# Patient Record
Sex: FEMALE | Race: BLACK OR AFRICAN AMERICAN | NOT HISPANIC OR LATINO | URBAN - METROPOLITAN AREA
[De-identification: names, ages, dates, MRNs, and addresses within clinical notes are randomized per-mention and may not be internally consistent; named-entity substitution may affect disease eponyms.]

---

## 2018-01-02 ENCOUNTER — EMERGENCY (EMERGENCY)
Facility: HOSPITAL | Age: 38
LOS: 1 days | Discharge: HOME | End: 2018-01-02
Attending: EMERGENCY MEDICINE
Payer: MEDICAID

## 2018-01-02 VITALS
TEMPERATURE: 97 F | RESPIRATION RATE: 16 BRPM | WEIGHT: 270.07 LBS | HEART RATE: 71 BPM | DIASTOLIC BLOOD PRESSURE: 64 MMHG | HEIGHT: 65 IN | OXYGEN SATURATION: 100 % | SYSTOLIC BLOOD PRESSURE: 123 MMHG

## 2018-01-02 VITALS — HEART RATE: 74 BPM | SYSTOLIC BLOOD PRESSURE: 128 MMHG | DIASTOLIC BLOOD PRESSURE: 79 MMHG

## 2018-01-02 LAB — HCG UR QL: NEGATIVE — SIGNIFICANT CHANGE UP

## 2018-01-02 PROCEDURE — 99284 EMERGENCY DEPT VISIT MOD MDM: CPT

## 2018-01-02 PROCEDURE — 99283 EMERGENCY DEPT VISIT LOW MDM: CPT | Mod: 25

## 2018-01-02 PROCEDURE — 96372 THER/PROPH/DIAG INJ SC/IM: CPT

## 2018-01-02 PROCEDURE — 93005 ELECTROCARDIOGRAM TRACING: CPT

## 2018-01-02 PROCEDURE — 71046 X-RAY EXAM CHEST 2 VIEWS: CPT | Mod: 26

## 2018-01-02 PROCEDURE — 81025 URINE PREGNANCY TEST: CPT

## 2018-01-02 PROCEDURE — 71046 X-RAY EXAM CHEST 2 VIEWS: CPT

## 2018-01-02 RX ORDER — KETOROLAC TROMETHAMINE 30 MG/ML
30 SYRINGE (ML) INJECTION ONCE
Qty: 0 | Refills: 0 | Status: DISCONTINUED | OUTPATIENT
Start: 2018-01-02 | End: 2018-01-02

## 2018-01-02 RX ORDER — DIAZEPAM 5 MG
5 TABLET ORAL ONCE
Qty: 0 | Refills: 0 | Status: DISCONTINUED | OUTPATIENT
Start: 2018-01-02 | End: 2018-01-02

## 2018-01-02 RX ORDER — OXYCODONE AND ACETAMINOPHEN 5; 325 MG/1; MG/1
1 TABLET ORAL ONCE
Qty: 0 | Refills: 0 | Status: DISCONTINUED | OUTPATIENT
Start: 2018-01-02 | End: 2018-01-02

## 2018-01-02 RX ORDER — OXYCODONE HYDROCHLORIDE 5 MG/1
1 TABLET ORAL
Qty: 5 | Refills: 0
Start: 2018-01-02

## 2018-01-02 RX ADMIN — Medication 30 MILLIGRAM(S): at 09:38

## 2018-01-02 RX ADMIN — OXYCODONE AND ACETAMINOPHEN 1 TABLET(S): 5; 325 TABLET ORAL at 11:57

## 2018-01-02 RX ADMIN — Medication 30 MILLIGRAM(S): at 11:51

## 2018-01-02 RX ADMIN — Medication 5 MILLIGRAM(S): at 09:38

## 2018-01-02 NOTE — ED PROVIDER NOTE - PHYSICAL EXAMINATION
MUSCULOSKELETAL:  Left upper thoracic tenderness and spasms, bilateral lower lumber tenderness and spasms, no vertebral tenderness

## 2018-01-02 NOTE — ED PROVIDER NOTE - MEDICAL DECISION MAKING DETAILS
Patient with back pain, likely musculoskeletal due to worse pain with bending/twisting. Had chest pain 2 days ago, none now. Normal CXR and EKG. History and exam not concerning for ACS or PE. PERC zero.

## 2018-01-02 NOTE — ED PROVIDER NOTE - CRANIAL NERVE AND PUPILLARY EXAM
cranial nerves 2-12 intact/central and peripheral vision intact/extra-ocular movements intact/gag reflex intact/tongue is midline/central vision intact/cough reflex intact

## 2018-01-02 NOTE — ED PROVIDER NOTE - OBJECTIVE STATEMENT
36 y/o F pt w/ no significant PMHx presents to the ED c/o back pain x 3 days. Pt states that pain is located in the upper left back, radiating to the neck and lower back; worse with. Pt states that when she woke up this morning her back pain caused her to have trouble getting out of bed. Pt notes that two days ago the pain radiated to the chest; no CP today or tomorrow. Pt denies fever, cough, SOB, abd pain, focal weakness, paresthesias, incontinence, and any other complaints. Allergic to penicillin.

## 2018-01-02 NOTE — ED PROVIDER NOTE - CARE PLAN
Principal Discharge DX:	Back pain, unspecified back location, unspecified back pain laterality, unspecified chronicity  Secondary Diagnosis:	Chest pain, unspecified type

## 2020-10-01 ENCOUNTER — APPOINTMENT (OUTPATIENT)
Dept: OBGYN | Facility: CLINIC | Age: 40
End: 2020-10-01

## 2020-10-12 ENCOUNTER — APPOINTMENT (OUTPATIENT)
Dept: OBGYN | Facility: CLINIC | Age: 40
End: 2020-10-12

## 2020-11-04 ENCOUNTER — APPOINTMENT (OUTPATIENT)
Dept: OBGYN | Facility: CLINIC | Age: 40
End: 2020-11-04
Payer: MEDICAID

## 2020-11-04 PROCEDURE — 81025 URINE PREGNANCY TEST: CPT

## 2020-11-04 PROCEDURE — 36415 COLL VENOUS BLD VENIPUNCTURE: CPT

## 2020-11-04 PROCEDURE — 99072 ADDL SUPL MATRL&STAF TM PHE: CPT

## 2020-11-04 PROCEDURE — 99203 OFFICE O/P NEW LOW 30 MIN: CPT | Mod: TH

## 2020-11-05 ENCOUNTER — APPOINTMENT (OUTPATIENT)
Dept: OBGYN | Facility: CLINIC | Age: 40
End: 2020-11-05

## 2020-11-18 ENCOUNTER — APPOINTMENT (OUTPATIENT)
Dept: OBGYN | Facility: CLINIC | Age: 40
End: 2020-11-18
Payer: MEDICAID

## 2020-11-18 PROCEDURE — 99211 OFF/OP EST MAY X REQ PHY/QHP: CPT | Mod: TH

## 2020-11-23 ENCOUNTER — APPOINTMENT (OUTPATIENT)
Dept: ANTEPARTUM | Facility: CLINIC | Age: 40
End: 2020-11-23
Payer: MEDICAID

## 2020-11-23 PROCEDURE — 99211 OFF/OP EST MAY X REQ PHY/QHP: CPT | Mod: TH

## 2020-11-24 ENCOUNTER — APPOINTMENT (OUTPATIENT)
Dept: ANTEPARTUM | Facility: CLINIC | Age: 40
End: 2020-11-24

## 2020-11-30 ENCOUNTER — APPOINTMENT (OUTPATIENT)
Dept: ANTEPARTUM | Facility: CLINIC | Age: 40
End: 2020-11-30
Payer: MEDICAID

## 2020-11-30 ENCOUNTER — APPOINTMENT (OUTPATIENT)
Dept: OBGYN | Facility: CLINIC | Age: 40
End: 2020-11-30
Payer: MEDICAID

## 2020-11-30 PROCEDURE — 99072 ADDL SUPL MATRL&STAF TM PHE: CPT

## 2020-11-30 PROCEDURE — 76818 FETAL BIOPHYS PROFILE W/NST: CPT

## 2020-11-30 PROCEDURE — 99211 OFF/OP EST MAY X REQ PHY/QHP: CPT | Mod: TH

## 2020-11-30 PROCEDURE — 76820 UMBILICAL ARTERY ECHO: CPT

## 2020-12-07 ENCOUNTER — APPOINTMENT (OUTPATIENT)
Dept: ANTEPARTUM | Facility: CLINIC | Age: 40
End: 2020-12-07
Payer: MEDICAID

## 2020-12-07 ENCOUNTER — APPOINTMENT (OUTPATIENT)
Dept: OBGYN | Facility: CLINIC | Age: 40
End: 2020-12-07
Payer: MEDICAID

## 2020-12-07 PROCEDURE — 99072 ADDL SUPL MATRL&STAF TM PHE: CPT

## 2020-12-07 PROCEDURE — 99211 OFF/OP EST MAY X REQ PHY/QHP: CPT | Mod: TH

## 2020-12-07 PROCEDURE — 76820 UMBILICAL ARTERY ECHO: CPT

## 2020-12-07 PROCEDURE — 76818 FETAL BIOPHYS PROFILE W/NST: CPT

## 2020-12-07 PROCEDURE — 76816 OB US FOLLOW-UP PER FETUS: CPT

## 2020-12-21 ENCOUNTER — APPOINTMENT (OUTPATIENT)
Dept: OBGYN | Facility: CLINIC | Age: 40
End: 2020-12-21

## 2020-12-22 ENCOUNTER — APPOINTMENT (OUTPATIENT)
Dept: OBGYN | Facility: CLINIC | Age: 40
End: 2020-12-22
Payer: MEDICAID

## 2020-12-22 PROCEDURE — 99211 OFF/OP EST MAY X REQ PHY/QHP: CPT | Mod: TH

## 2020-12-22 PROCEDURE — 99072 ADDL SUPL MATRL&STAF TM PHE: CPT

## 2020-12-30 ENCOUNTER — APPOINTMENT (OUTPATIENT)
Dept: ANTEPARTUM | Facility: CLINIC | Age: 40
End: 2020-12-30
Payer: MEDICAID

## 2020-12-30 ENCOUNTER — APPOINTMENT (OUTPATIENT)
Dept: OBGYN | Facility: CLINIC | Age: 40
End: 2020-12-30
Payer: MEDICAID

## 2020-12-30 PROCEDURE — 76816 OB US FOLLOW-UP PER FETUS: CPT

## 2020-12-30 PROCEDURE — 99072 ADDL SUPL MATRL&STAF TM PHE: CPT

## 2020-12-30 PROCEDURE — 99211 OFF/OP EST MAY X REQ PHY/QHP: CPT | Mod: TH

## 2020-12-30 PROCEDURE — 76819 FETAL BIOPHYS PROFIL W/O NST: CPT

## 2021-01-06 ENCOUNTER — APPOINTMENT (OUTPATIENT)
Dept: OBGYN | Facility: CLINIC | Age: 41
End: 2021-01-06
Payer: MEDICAID

## 2021-01-06 PROCEDURE — 99072 ADDL SUPL MATRL&STAF TM PHE: CPT

## 2021-01-06 PROCEDURE — 99211 OFF/OP EST MAY X REQ PHY/QHP: CPT | Mod: TH

## 2021-01-13 ENCOUNTER — APPOINTMENT (OUTPATIENT)
Dept: OBGYN | Facility: CLINIC | Age: 41
End: 2021-01-13
Payer: MEDICAID

## 2021-01-13 PROCEDURE — 99211 OFF/OP EST MAY X REQ PHY/QHP: CPT | Mod: TH

## 2021-01-13 PROCEDURE — 99072 ADDL SUPL MATRL&STAF TM PHE: CPT

## 2021-01-19 ENCOUNTER — APPOINTMENT (OUTPATIENT)
Dept: ANTEPARTUM | Facility: CLINIC | Age: 41
End: 2021-01-19
Payer: MEDICAID

## 2021-01-19 ENCOUNTER — APPOINTMENT (OUTPATIENT)
Dept: OBGYN | Facility: CLINIC | Age: 41
End: 2021-01-19
Payer: MEDICAID

## 2021-01-19 PROCEDURE — 99072 ADDL SUPL MATRL&STAF TM PHE: CPT

## 2021-01-19 PROCEDURE — 99211 OFF/OP EST MAY X REQ PHY/QHP: CPT | Mod: TH

## 2021-01-19 PROCEDURE — 76818 FETAL BIOPHYS PROFILE W/NST: CPT

## 2021-01-19 PROCEDURE — 76816 OB US FOLLOW-UP PER FETUS: CPT

## 2021-01-20 ENCOUNTER — TRANSCRIPTION ENCOUNTER (OUTPATIENT)
Age: 41
End: 2021-01-20

## 2021-01-20 ENCOUNTER — OUTPATIENT (OUTPATIENT)
Dept: INPATIENT UNIT | Facility: HOSPITAL | Age: 41
LOS: 1 days | End: 2021-01-20

## 2021-01-20 ENCOUNTER — INPATIENT (INPATIENT)
Facility: HOSPITAL | Age: 41
LOS: 3 days | Discharge: HOME | End: 2021-01-24
Attending: STUDENT IN AN ORGANIZED HEALTH CARE EDUCATION/TRAINING PROGRAM | Admitting: STUDENT IN AN ORGANIZED HEALTH CARE EDUCATION/TRAINING PROGRAM
Payer: MEDICAID

## 2021-01-20 VITALS
SYSTOLIC BLOOD PRESSURE: 142 MMHG | TEMPERATURE: 98 F | DIASTOLIC BLOOD PRESSURE: 77 MMHG | HEART RATE: 102 BPM | RESPIRATION RATE: 15 BRPM

## 2021-01-20 DIAGNOSIS — O13.3 GESTATIONAL [PREGNANCY-INDUCED] HYPERTENSION WITHOUT SIGNIFICANT PROTEINURIA, THIRD TRIMESTER: ICD-10-CM

## 2021-01-20 DIAGNOSIS — Z98.84 BARIATRIC SURGERY STATUS: Chronic | ICD-10-CM

## 2021-01-20 DIAGNOSIS — Z98.890 OTHER SPECIFIED POSTPROCEDURAL STATES: Chronic | ICD-10-CM

## 2021-01-20 DIAGNOSIS — Z86.19 PERSONAL HISTORY OF OTHER INFECTIOUS AND PARASITIC DISEASES: Chronic | ICD-10-CM

## 2021-01-20 DIAGNOSIS — Z3A.00 WEEKS OF GESTATION OF PREGNANCY NOT SPECIFIED: ICD-10-CM

## 2021-01-20 DIAGNOSIS — O26.899 OTHER SPECIFIED PREGNANCY RELATED CONDITIONS, UNSPECIFIED TRIMESTER: ICD-10-CM

## 2021-01-20 DIAGNOSIS — O04.80 (INDUCED) TERMINATION OF PREGNANCY WITH UNSPECIFIED COMPLICATIONS: Chronic | ICD-10-CM

## 2021-01-20 DIAGNOSIS — O16.9 UNSPECIFIED MATERNAL HYPERTENSION, UNSPECIFIED TRIMESTER: ICD-10-CM

## 2021-01-20 LAB
ALBUMIN SERPL ELPH-MCNC: 3.6 G/DL — SIGNIFICANT CHANGE UP (ref 3.3–5)
ALP SERPL-CCNC: 133 U/L — HIGH (ref 40–120)
ALT FLD-CCNC: 14 U/L — SIGNIFICANT CHANGE UP (ref 4–33)
ANION GAP SERPL CALC-SCNC: 12 MMOL/L — SIGNIFICANT CHANGE UP (ref 7–14)
APPEARANCE UR: ABNORMAL
APTT BLD: 26.2 SEC — LOW (ref 27–36.3)
AST SERPL-CCNC: 17 U/L — SIGNIFICANT CHANGE UP (ref 4–32)
BACTERIA # UR AUTO: NEGATIVE — SIGNIFICANT CHANGE UP
BASOPHILS # BLD AUTO: 0.01 K/UL — SIGNIFICANT CHANGE UP (ref 0–0.2)
BASOPHILS # BLD AUTO: 0.02 K/UL — SIGNIFICANT CHANGE UP (ref 0–0.2)
BASOPHILS NFR BLD AUTO: 0.1 % — SIGNIFICANT CHANGE UP (ref 0–2)
BASOPHILS NFR BLD AUTO: 0.3 % — SIGNIFICANT CHANGE UP (ref 0–2)
BILIRUB SERPL-MCNC: <0.2 MG/DL — SIGNIFICANT CHANGE UP (ref 0.2–1.2)
BILIRUB UR-MCNC: NEGATIVE — SIGNIFICANT CHANGE UP
BLD GP AB SCN SERPL QL: NEGATIVE — SIGNIFICANT CHANGE UP
BUN SERPL-MCNC: 9 MG/DL — SIGNIFICANT CHANGE UP (ref 7–23)
CALCIUM SERPL-MCNC: 9.4 MG/DL — SIGNIFICANT CHANGE UP (ref 8.4–10.5)
CHLORIDE SERPL-SCNC: 102 MMOL/L — SIGNIFICANT CHANGE UP (ref 98–107)
CO2 SERPL-SCNC: 20 MMOL/L — LOW (ref 22–31)
COLOR SPEC: YELLOW — SIGNIFICANT CHANGE UP
CREAT ?TM UR-MCNC: 143 MG/DL — SIGNIFICANT CHANGE UP
CREAT SERPL-MCNC: 0.67 MG/DL — SIGNIFICANT CHANGE UP (ref 0.5–1.3)
DIFF PNL FLD: ABNORMAL
EOSINOPHIL # BLD AUTO: 0.01 K/UL — SIGNIFICANT CHANGE UP (ref 0–0.5)
EOSINOPHIL # BLD AUTO: 0.04 K/UL — SIGNIFICANT CHANGE UP (ref 0–0.5)
EOSINOPHIL NFR BLD AUTO: 0.1 % — SIGNIFICANT CHANGE UP (ref 0–6)
EOSINOPHIL NFR BLD AUTO: 0.6 % — SIGNIFICANT CHANGE UP (ref 0–6)
EPI CELLS # UR: 2 /HPF — SIGNIFICANT CHANGE UP (ref 0–5)
FIBRINOGEN PPP-MCNC: 592 MG/DL — HIGH (ref 290–520)
GLUCOSE SERPL-MCNC: 87 MG/DL — SIGNIFICANT CHANGE UP (ref 70–99)
GLUCOSE UR QL: NEGATIVE — SIGNIFICANT CHANGE UP
HCT VFR BLD CALC: 35.4 % — SIGNIFICANT CHANGE UP (ref 34.5–45)
HCT VFR BLD CALC: 36.9 % — SIGNIFICANT CHANGE UP (ref 34.5–45)
HGB BLD-MCNC: 11.1 G/DL — LOW (ref 11.5–15.5)
HGB BLD-MCNC: 11.4 G/DL — LOW (ref 11.5–15.5)
HYALINE CASTS # UR AUTO: 2 /LPF — SIGNIFICANT CHANGE UP (ref 0–7)
IANC: 4.68 K/UL — SIGNIFICANT CHANGE UP (ref 1.5–8.5)
IANC: 5.9 K/UL — SIGNIFICANT CHANGE UP (ref 1.5–8.5)
IMM GRANULOCYTES NFR BLD AUTO: 0.4 % — SIGNIFICANT CHANGE UP (ref 0–1.5)
IMM GRANULOCYTES NFR BLD AUTO: 0.8 % — SIGNIFICANT CHANGE UP (ref 0–1.5)
INR BLD: 0.91 RATIO — SIGNIFICANT CHANGE UP (ref 0.88–1.16)
KETONES UR-MCNC: ABNORMAL
LDH SERPL L TO P-CCNC: 162 U/L — SIGNIFICANT CHANGE UP (ref 135–225)
LEUKOCYTE ESTERASE UR-ACNC: NEGATIVE — SIGNIFICANT CHANGE UP
LYMPHOCYTES # BLD AUTO: 1.14 K/UL — SIGNIFICANT CHANGE UP (ref 1–3.3)
LYMPHOCYTES # BLD AUTO: 1.63 K/UL — SIGNIFICANT CHANGE UP (ref 1–3.3)
LYMPHOCYTES # BLD AUTO: 15.1 % — SIGNIFICANT CHANGE UP (ref 13–44)
LYMPHOCYTES # BLD AUTO: 22.8 % — SIGNIFICANT CHANGE UP (ref 13–44)
MCHC RBC-ENTMCNC: 27.2 PG — SIGNIFICANT CHANGE UP (ref 27–34)
MCHC RBC-ENTMCNC: 27.3 PG — SIGNIFICANT CHANGE UP (ref 27–34)
MCHC RBC-ENTMCNC: 30.9 GM/DL — LOW (ref 32–36)
MCHC RBC-ENTMCNC: 31.4 GM/DL — LOW (ref 32–36)
MCV RBC AUTO: 87 FL — SIGNIFICANT CHANGE UP (ref 80–100)
MCV RBC AUTO: 88.1 FL — SIGNIFICANT CHANGE UP (ref 80–100)
MONOCYTES # BLD AUTO: 0.46 K/UL — SIGNIFICANT CHANGE UP (ref 0–0.9)
MONOCYTES # BLD AUTO: 0.72 K/UL — SIGNIFICANT CHANGE UP (ref 0–0.9)
MONOCYTES NFR BLD AUTO: 10.1 % — SIGNIFICANT CHANGE UP (ref 2–14)
MONOCYTES NFR BLD AUTO: 6.1 % — SIGNIFICANT CHANGE UP (ref 2–14)
NEUTROPHILS # BLD AUTO: 4.68 K/UL — SIGNIFICANT CHANGE UP (ref 1.8–7.4)
NEUTROPHILS # BLD AUTO: 5.9 K/UL — SIGNIFICANT CHANGE UP (ref 1.8–7.4)
NEUTROPHILS NFR BLD AUTO: 65.6 % — SIGNIFICANT CHANGE UP (ref 43–77)
NEUTROPHILS NFR BLD AUTO: 78 % — HIGH (ref 43–77)
NITRITE UR-MCNC: NEGATIVE — SIGNIFICANT CHANGE UP
NRBC # BLD: 0 /100 WBCS — SIGNIFICANT CHANGE UP
NRBC # BLD: 0 /100 WBCS — SIGNIFICANT CHANGE UP
NRBC # FLD: 0 K/UL — SIGNIFICANT CHANGE UP
NRBC # FLD: 0 K/UL — SIGNIFICANT CHANGE UP
PH UR: 6.5 — SIGNIFICANT CHANGE UP (ref 5–8)
PLATELET # BLD AUTO: 232 K/UL — SIGNIFICANT CHANGE UP (ref 150–400)
PLATELET # BLD AUTO: 250 K/UL — SIGNIFICANT CHANGE UP (ref 150–400)
POTASSIUM SERPL-MCNC: 4 MMOL/L — SIGNIFICANT CHANGE UP (ref 3.5–5.3)
POTASSIUM SERPL-SCNC: 4 MMOL/L — SIGNIFICANT CHANGE UP (ref 3.5–5.3)
PROT ?TM UR-MCNC: 36 MG/DL — SIGNIFICANT CHANGE UP
PROT ?TM UR-MCNC: 36 MG/DL — SIGNIFICANT CHANGE UP
PROT SERPL-MCNC: 6.7 G/DL — SIGNIFICANT CHANGE UP (ref 6–8.3)
PROT UR-MCNC: ABNORMAL
PROT/CREAT UR-RTO: 0.3 RATIO — HIGH (ref 0–0.2)
PROTHROM AB SERPL-ACNC: 10.5 SEC — LOW (ref 10.6–13.6)
RBC # BLD: 4.07 M/UL — SIGNIFICANT CHANGE UP (ref 3.8–5.2)
RBC # BLD: 4.19 M/UL — SIGNIFICANT CHANGE UP (ref 3.8–5.2)
RBC # FLD: 16.7 % — HIGH (ref 10.3–14.5)
RBC # FLD: 16.8 % — HIGH (ref 10.3–14.5)
RBC CASTS # UR COMP ASSIST: 3 /HPF — SIGNIFICANT CHANGE UP (ref 0–4)
RH IG SCN BLD-IMP: POSITIVE — SIGNIFICANT CHANGE UP
RH IG SCN BLD-IMP: POSITIVE — SIGNIFICANT CHANGE UP
SARS-COV-2 RNA SPEC QL NAA+PROBE: SIGNIFICANT CHANGE UP
SODIUM SERPL-SCNC: 134 MMOL/L — LOW (ref 135–145)
SP GR SPEC: 1.02 — SIGNIFICANT CHANGE UP (ref 1.01–1.02)
URATE SERPL-MCNC: 3 MG/DL — SIGNIFICANT CHANGE UP (ref 2.5–7)
UROBILINOGEN FLD QL: SIGNIFICANT CHANGE UP
WBC # BLD: 7.14 K/UL — SIGNIFICANT CHANGE UP (ref 3.8–10.5)
WBC # BLD: 7.56 K/UL — SIGNIFICANT CHANGE UP (ref 3.8–10.5)
WBC # FLD AUTO: 7.14 K/UL — SIGNIFICANT CHANGE UP (ref 3.8–10.5)
WBC # FLD AUTO: 7.56 K/UL — SIGNIFICANT CHANGE UP (ref 3.8–10.5)
WBC UR QL: 2 /HPF — SIGNIFICANT CHANGE UP (ref 0–5)

## 2021-01-20 RX ORDER — CITRIC ACID/SODIUM CITRATE 300-500 MG
15 SOLUTION, ORAL ORAL EVERY 6 HOURS
Refills: 0 | Status: DISCONTINUED | OUTPATIENT
Start: 2021-01-20 | End: 2021-01-21

## 2021-01-20 RX ORDER — VANCOMYCIN HCL 1 G
2000 VIAL (EA) INTRAVENOUS ONCE
Refills: 0 | Status: COMPLETED | OUTPATIENT
Start: 2021-01-20 | End: 2021-01-20

## 2021-01-20 RX ORDER — SODIUM CHLORIDE 9 MG/ML
1000 INJECTION, SOLUTION INTRAVENOUS
Refills: 0 | Status: DISCONTINUED | OUTPATIENT
Start: 2021-01-20 | End: 2021-01-21

## 2021-01-20 RX ORDER — AMPICILLIN TRIHYDRATE 250 MG
1 CAPSULE ORAL EVERY 4 HOURS
Refills: 0 | Status: DISCONTINUED | OUTPATIENT
Start: 2021-01-20 | End: 2021-01-20

## 2021-01-20 RX ORDER — VANCOMYCIN HCL 1 G
2000 VIAL (EA) INTRAVENOUS EVERY 8 HOURS
Refills: 0 | Status: DISCONTINUED | OUTPATIENT
Start: 2021-01-21 | End: 2021-01-21

## 2021-01-20 RX ORDER — INFLUENZA VIRUS VACCINE 15; 15; 15; 15 UG/.5ML; UG/.5ML; UG/.5ML; UG/.5ML
0.5 SUSPENSION INTRAMUSCULAR ONCE
Refills: 0 | Status: COMPLETED | OUTPATIENT
Start: 2021-01-20 | End: 2021-01-20

## 2021-01-20 RX ORDER — OXYTOCIN 10 UNIT/ML
333.33 VIAL (ML) INJECTION
Qty: 20 | Refills: 0 | Status: DISCONTINUED | OUTPATIENT
Start: 2021-01-20 | End: 2021-01-21

## 2021-01-20 RX ORDER — VANCOMYCIN HCL 1 G
VIAL (EA) INTRAVENOUS
Refills: 0 | Status: DISCONTINUED | OUTPATIENT
Start: 2021-01-20 | End: 2021-01-20

## 2021-01-20 RX ORDER — MORPHINE SULFATE 50 MG/1
4 CAPSULE, EXTENDED RELEASE ORAL ONCE
Refills: 0 | Status: DISCONTINUED | OUTPATIENT
Start: 2021-01-20 | End: 2021-01-20

## 2021-01-20 RX ORDER — SODIUM CHLORIDE 9 MG/ML
1000 INJECTION, SOLUTION INTRAVENOUS
Refills: 0 | Status: DISCONTINUED | OUTPATIENT
Start: 2021-01-20 | End: 2021-01-20

## 2021-01-20 RX ORDER — OXYTOCIN 10 UNIT/ML
333.33 VIAL (ML) INJECTION
Qty: 20 | Refills: 0 | Status: DISCONTINUED | OUTPATIENT
Start: 2021-01-20 | End: 2021-01-20

## 2021-01-20 RX ORDER — AMPICILLIN TRIHYDRATE 250 MG
2 CAPSULE ORAL ONCE
Refills: 0 | Status: DISCONTINUED | OUTPATIENT
Start: 2021-01-20 | End: 2021-01-20

## 2021-01-20 RX ORDER — VANCOMYCIN HCL 1 G
1500 VIAL (EA) INTRAVENOUS ONCE
Refills: 0 | Status: DISCONTINUED | OUTPATIENT
Start: 2021-01-20 | End: 2021-01-20

## 2021-01-20 RX ORDER — VANCOMYCIN HCL 1 G
VIAL (EA) INTRAVENOUS
Refills: 0 | Status: DISCONTINUED | OUTPATIENT
Start: 2021-01-20 | End: 2021-01-21

## 2021-01-20 RX ORDER — SODIUM CHLORIDE 9 MG/ML
1000 INJECTION, SOLUTION INTRAVENOUS ONCE
Refills: 0 | Status: COMPLETED | OUTPATIENT
Start: 2021-01-20 | End: 2021-01-20

## 2021-01-20 RX ORDER — VANCOMYCIN HCL 1 G
1500 VIAL (EA) INTRAVENOUS EVERY 8 HOURS
Refills: 0 | Status: CANCELLED | OUTPATIENT
Start: 2021-01-21 | End: 2021-01-20

## 2021-01-20 RX ADMIN — MORPHINE SULFATE 4 MILLIGRAM(S): 50 CAPSULE, EXTENDED RELEASE ORAL at 22:03

## 2021-01-20 RX ADMIN — Medication 250 MILLIGRAM(S): at 23:08

## 2021-01-20 RX ADMIN — MORPHINE SULFATE 4 MILLIGRAM(S): 50 CAPSULE, EXTENDED RELEASE ORAL at 22:06

## 2021-01-20 NOTE — OB PROVIDER TRIAGE NOTE - NSHPLABSRESULTS_GEN_ALL_CORE
11.1   7.14  )-----------( 250      ( 2021 18:15 )             35.4         134<L>  |  102  |  9   ----------------------------<  87  4.0   |  20<L>  |  0.67    Ca    9.4      2021 18:15    TPro  6.7  /  Alb  3.6  /  TBili  <0.2  /  DBili  x   /  AST  17  /  ALT  14  /  AlkPhos  133<H>        PT/INR - ( 2021 18:15 )   PT: 10.5 sec;   INR: 0.91 ratio         PTT - ( 2021 18:15 )  PTT:26.2 sec    Fibrinogen Assay (21 @ 18:15)   Fibrinogen Assay: 592 mg/dL     Urinalysis Basic - ( 2021 18:15 )    Color: Yellow / Appearance: Slightly Turbid / S.024 / pH: x  Gluc: x / Ketone: Trace  / Bili: Negative / Urobili: <2 mg/dL   Blood: x / Protein: 30 mg/dL / Nitrite: Negative   Leuk Esterase: Negative / RBC: 3 /HPF / WBC 2 /HPF   Sq Epi: x / Non Sq Epi: 2 /HPF / Bacteria: Negative    Total Protein, Random Urine (21 @ 18:15)   Total Protein, Random Urine: 36: Reference range not established for this test mg/dL     `Protein/Creatinine Ratio, Urine (21 @ 18:15)   Total Protein, Random Urine: 36: Reference range not established for this test mg/dL   Creatinine, Random Urine: 143: Reference Range:   Normal= 15-30 mg/kg Body Weight/Day mg/dL

## 2021-01-20 NOTE — OB PROVIDER TRIAGE NOTE - NS_OBGYNHISTORY_OBGYN_ALL_OB_FT
Incomplete SAB X1  Surgical ETOP x1  HSV II, Last outbreak 3 weeks ago.   h/o fibroids, s/p Lap. Myomectomy (pt states cleared by Dr Caba for vaginal delivery)  Pt denies any h/o: Abn. PAP, ovarian cysts, breast problems   Incomplete SAB X1  Surgical ETOP x1  HSV II, Last outbreak 3 weeks ago.   h/o fibroids, s/p hysteroscopic Myomectomy  Pt denies any h/o: Abn. PAP, ovarian cysts, breast problems

## 2021-01-20 NOTE — OB PROVIDER H&P - ASSESSMENT
41 y/o Black female, para 0020 @ 40+5 wks gestation, single IUP.  IOL for gHTN  Cephalic via bedside sonogram  GBS Positive  HSV II+, negative SSE  Plan of care d/w Dr Carrasco    Plan:  -Admit to L&D  -For PO Cytotec and cervical balloon  -Continuous EFM  -Routine Labs/ Covid PCR/ IgG  -Vancomycin for GBS prophylaxis in the setting of PCN allergy  -VS per routine  -IVF  -Clear fluid diet  -Reassess pain management options PRN  -Anesthesia consult

## 2021-01-20 NOTE — OB PROVIDER H&P - NS_OBGYNHISTORY_OBGYN_ALL_OB_FT
Incomplete SAB X1  Surgical ETOP x1  HSV II, Last outbreak 3 weeks ago.   h/o fibroids, s/p hysteroscopic Myomectomy  Pt denies any h/o: Abn. PAP, ovarian cysts, breast problems

## 2021-01-20 NOTE — OB PROVIDER H&P - ATTENDING COMMENTS
Agree with above note. Patient is P0 at 40+wga with preE w/o SF. Plan for IOL with PO cytotec and cervical balloon. Ampicillin for GBS+.     RAMANDEEP Carrasco MD

## 2021-01-20 NOTE — OB PROVIDER H&P - HISTORY OF PRESENT ILLNESS
Patient of Dr Caba  41 y/o Black female, para 0020 @ 40+5 wks gestation, single IUP.  Presents to triage with c/o ctx q 6 min since 1am with brownish discharge. Pt s/p VE yesterday in Dr Caba's office: "closed"  Pt endorses strong fetal movement, denies any leakage of fluid.    A/P Complications: GBS Positive; HSV II outbreak 3 weeks ago. Anemia, s/p Iron infusion x 5 doses.   Blood Transfusion: Patient will accept blood    Covid Assessment: Pt denies any: fever, chills, cough, SOB or any recent known exposure to Covid-19.    Allergies: Penicillin (Anaphylaxis)  Meds: PNV; Valtrex; Albuterol PRN  PMH: Anemia, Asthma (Last exacerbation "many years ago:, hospitalization in 3rd grade, denies intubation); Migraines  PSH: Hysteroscopic Myomectomy (); D&C X2; Gastric sleeve ()   OBgynHx    Incomplete SAB X1  Surgical ETOP x1  HSV II, Last outbreak 3 weeks ago.   h/o fibroids, s/p hysteroscopic Myomectomy  Pt denies any h/o: Abn. PAP, ovarian cysts, breast problems  PSY: Denies  EtOH/ Smoke/ Recreational substance use: denies  FH: Denies  H/W/BMI: 65"/227#/37.8         Patient of Dr Carrasco  41 y/o Black female, para 0020 @ 40+5 wks gestation, single IUP.  Presents to triage with c/o ctx q 6 min since 1am with brownish discharge. Pt s/p VE yesterday in Dr Caba's office: "closed"  Pt endorses strong fetal movement, denies any leakage of fluid.    A/P Complications: GBS Positive; HSV II outbreak 3 weeks ago. Anemia, s/p Iron infusion x 5 doses.   Blood Transfusion: Patient will accept blood    Covid Assessment: Pt denies any: fever, chills, cough, SOB or any recent known exposure to Covid-19.    Allergies: Penicillin (Anaphylaxis)  Meds: PNV; Valtrex; Albuterol PRN  PMH: Anemia, Asthma (Last exacerbation "many years ago:, hospitalization in 3rd grade, denies intubation); Migraines  PSH: Hysteroscopic Myomectomy (); D&C X2; Gastric sleeve ()   OBgynHx    Incomplete SAB X1  Surgical ETOP x1  HSV II, Last outbreak 3 weeks ago.   h/o fibroids, s/p hysteroscopic Myomectomy  Pt denies any h/o: Abn. PAP, ovarian cysts, breast problems  PSY: Denies  EtOH/ Smoke/ Recreational substance use: denies  FH: Denies  H/W/BMI: 65"/227#/37.8

## 2021-01-20 NOTE — OB PROVIDER H&P - NSHOSPITALIZATION_OBGYN_ALL_OB
Approved.
Patient called office she is asking for a Covid Antibody blood order. Patient has no symptoms nor has been tested before she states she thinks she may of had it in January.
No

## 2021-01-20 NOTE — OB PROVIDER TRIAGE NOTE - HISTORY OF PRESENT ILLNESS
Patient of Dr Caba  41 y/o Black female, para 0020 @ 40+5 wks gestation, single IUP.  Presents to triage with c/o ctx q 6 min since 1am with brownish discharge. Pt s/p VE yesterday in Dr Caba's office: "closed"  Pt endorses strong fetal movement, denies any leakage of fluid.    A/P Complications: GBS Positive; HSV II outbreak 3 weeks ago. Anemia, s/p Iron infusion x 5 doses.   Blood Transfusion: Patient will accept blood    Covid Assessment: Pt denies any: fever, chills, cough, SOB or any recent known exposure to Covid-19.    Allergies: Penicillin (Anaphylaxis)  Meds: PNV; Valtrex; Albuterol PRN  PMH: Anemia, Asthma (Last exacerbation "many years ago:, hospitalization in 3rd grade, denies intubation); Migraines  PSH: Myomectomy (); D&C X2; Gastric sleeve ()   OBgynHx    Incomplete SAB X1  Surgical ETOP x1  HSV II, Last outbreak 3 weeks ago.   h/o fibroids, s/p Lap. Myomectomy (pt states cleared by Dr Caba for vaginal delivery)  Pt denies any h/o: Abn. PAP, ovarian cysts, breast problems  PSY: Denies  EtOH/ Smoke/ Recreational substance use: denies  FH: Denies  H/W/BMI: 65"/227#/37.8    Prenatal Chart Review:  GBS:   Blood Type:  HIV:  HBsAg:  RPR:  Rubella:      Patient of Dr Caba  39 y/o Black female, para 0020 @ 40+5 wks gestation, single IUP.  Presents to triage with c/o ctx q 6 min since 1am with brownish discharge. Pt s/p VE yesterday in Dr Caba's office: "closed"  Pt endorses strong fetal movement, denies any leakage of fluid.    A/P Complications: GBS Positive; HSV II outbreak 3 weeks ago. Anemia, s/p Iron infusion x 5 doses.   Blood Transfusion: Patient will accept blood    Covid Assessment: Pt denies any: fever, chills, cough, SOB or any recent known exposure to Covid-19.    Allergies: Penicillin (Anaphylaxis)  Meds: PNV; Valtrex; Albuterol PRN  PMH: Anemia, Asthma (Last exacerbation "many years ago:, hospitalization in 3rd grade, denies intubation); Migraines  PSH: Myomectomy (); D&C X2; Gastric sleeve ()   OBgynHx    Incomplete SAB X1  Surgical ETOP x1  HSV II, Last outbreak 3 weeks ago.   h/o fibroids, s/p Lap. Myomectomy (pt states cleared by Dr Caba for vaginal delivery)  Pt denies any h/o: Abn. PAP, ovarian cysts, breast problems  PSY: Denies  EtOH/ Smoke/ Recreational substance use: denies  FH: Denies  H/W/BMI: 65"/227#/37.8         Patient of Dr Caba  39 y/o Black female, para 0020 @ 40+5 wks gestation, single IUP.  Presents to triage with c/o ctx q 6 min since 1am with brownish discharge. Pt s/p VE yesterday in Dr Caba's office: "closed"  Pt endorses strong fetal movement, denies any leakage of fluid.    A/P Complications: GBS Positive; HSV II outbreak 3 weeks ago. Anemia, s/p Iron infusion x 5 doses.   Blood Transfusion: Patient will accept blood    Covid Assessment: Pt denies any: fever, chills, cough, SOB or any recent known exposure to Covid-19.    Allergies: Penicillin (Anaphylaxis)  Meds: PNV; Valtrex; Albuterol PRN  PMH: Anemia, Asthma (Last exacerbation "many years ago:, hospitalization in 3rd grade, denies intubation); Migraines  PSH: Hysteroscopic Myomectomy (); D&C X2; Gastric sleeve ()   OBgynHx    Incomplete SAB X1  Surgical ETOP x1  HSV II, Last outbreak 3 weeks ago.   h/o fibroids, s/p hysteroscopic Myomectomy  Pt denies any h/o: Abn. PAP, ovarian cysts, breast problems  PSY: Denies  EtOH/ Smoke/ Recreational substance use: denies  FH: Denies  H/W/BMI: 65"/227#/37.8

## 2021-01-20 NOTE — OB PROVIDER H&P - NSHPPHYSICALEXAM_GEN_ALL_CORE
Gen: NAD; A+O x 3  Cardiac: S1/S2, R/R/R  Pulm: CTAB, unlabored breathing  Abdomen: Gravid, soft, non-tender  VS-BP: 142/77, rpt 126/75 (Pt asymptomatic, denies h/o elevated BP, headache, vision change/scotoma, dizziness, SOB, N+V, RUQ/epigastric pain), P 80, RR 18, T 36.7, Pain 8/10  BP trends: 142/77, 126/75, 137/77, 124/66, 125/68, 146/86  Cat 1 tracin bpm, moderate variability, +accels, no decels  [place]: Irregular q 6-9 min  SSE: Negative lesions  SVE: 0/50/-3, intact membranes  GBS Positive  Clinical EFW: 3200g  Limited TAS: SLIUP, Cephalic, posterior placenta, BPP 8/8, LEE 8.74cm  Plan:  HELLP labs

## 2021-01-20 NOTE — OB PROVIDER TRIAGE NOTE - PMH
Anemia    Asthma  albuterol/proventil  Incomplete spontaneous     Legal termination of pregnancy    Migraines

## 2021-01-20 NOTE — OB PROVIDER TRIAGE NOTE - NSHPPHYSICALEXAM_GEN_ALL_CORE
Gen: NAD; A+O x 3  Cardiac: S1/S2, R/R/R  Pulm: CTAB, unlabored breathing  Abdomen: Gravid, soft, non-tender  VS-BP: 142/77, rpt 126/75 (Pt asymptomatic, denies h/o elevated BP, headache, vision change/scotoma, dizziness, SOB, N+V, RUQ/epigastric pain), P 80, RR 18, T 36.7, Pain 8/10  NST in progress:   [place]: Irregular q 6-9 min  SVE: 0/50/-3, intact membranes  GBS Positive  Clinical EFW: 3200g  Limited TAS: SLIUP, Cephalic, posterior placenta, BPP 8/8, LEE 8.74cm Gen: NAD; A+O x 3  Cardiac: S1/S2, R/R/R  Pulm: CTAB, unlabored breathing  Abdomen: Gravid, soft, non-tender  VS-BP: 142/77, rpt 126/75 (Pt asymptomatic, denies h/o elevated BP, headache, vision change/scotoma, dizziness, SOB, N+V, RUQ/epigastric pain), P 80, RR 18, T 36.7, Pain 8/10  BP trends: 142/77, 126/75, 137/77, 124/66, 125/68, 146/86  Cat 1 tracin bpm, moderate variability, +accels, no decels  [place]: Irregular q 6-9 min  SSE: Negative lesions  SVE: 0/50/-3, intact membranes  GBS Positive  Clinical EFW: 3200g  Limited TAS: SLIUP, Cephalic, posterior placenta, BPP 8/8, LEE 8.74cm  Plan:  HELLP labs

## 2021-01-20 NOTE — OB PROVIDER TRIAGE NOTE - NSPREVIOUSSPONTANEOUSTERMINATIONSMORE20_OBGYN_ALL_OB_NU
[address] [facility] 
[phone] Patient: Cassandra Carlin MRN: [id number] :1989 Visit Information Date & Time Provider Department Dept. Phone Encounter #  
 2018  1:15 PM Dyllan Braxton PA-C [facility] [phone] [id number] Follow-up Instructions Return if symptoms worsen or fail to improve. Upcoming Health Maintenance Date Due  
 PAP AKA CERVICAL CYTOLOGY 2012 DTaP/Tdap/Td series (2 - Td) 2025 Allergies as of 2018  Review Complete On: 2018 By: Marco Garcia LPN No Known Allergies Current Immunizations  Reviewed on 2017 Name Date HPV (9-valent) 2017, 3/17/2017 HPV (Quad) 2016 Tdap 2015 Not reviewed this visit You Were Diagnosed With   
  
 Codes Comments Acute vaginitis    -  Primary ICD-10-CM: N76.0 ICD-9-CM: 616.10 Screen for STD (sexually transmitted disease)     ICD-10-CM: Z11.3 ICD-9-CM: V74.5 Vitals BP Pulse Temp Resp Height(growth percentile) Weight(growth percentile) 113/74 (BP 1 Location: Left arm, BP Patient Position: Sitting) 75 96.7 °F (35.9 °C) (Oral) 18 5' 7\" (1.702 m) 187 lb (84.8 kg) LMP SpO2 BMI OB Status Smoking Status 2018 97% 29.29 kg/m2 Having regular periods Never Smoker Vitals History BMI and BSA Data Body Mass Index Body Surface Area  
 29.29 kg/m 2 2 m 2 Preferred Pharmacy Pharmacy Name Phone [id number], [place] [address] [place] [phone] Your Updated Medication List  
  
   
This list is accurate as of 18  1:44 PM.  Always use your most recent med list.  
  
  
  
  
 dextroamphetamine-amphetamine 15 mg tablet Commonly known as:  ADDERALL  
take 1 tablet by mouth twice a day  
  
 fluconazole 150 mg tablet Commonly known as:  DIFLUCAN  
 Take 1 Tab by mouth every seven (7) days for 2 doses. Take one by mouth weekly. Elyssa Cox 1.5/30 (21) 1.5-30 mg-mcg Tab Generic drug:  norethindrone ac-eth estradiol Take  by mouth. LORazepam 0.5 mg tablet Commonly known as:  ATIVAN Take  by mouth.  
  
 metroNIDAZOLE 0.75 % vaginal gel Commonly known as:  Rendell Saint Insert 1 Applicator into vagina daily for 5 days. PROzac 10 mg capsule Generic drug:  FLUoxetine Take 10 mg by mouth three (3) times daily. Prescriptions Sent to Pharmacy Refills  
 fluconazole (DIFLUCAN) 150 mg tablet 0 Sig: Take 1 Tab by mouth every seven (7) days for 2 doses. Take one by mouth weekly. Class: Normal  
 Pharmacy: [address] Ph #: [phone] Route: Oral  
 metroNIDAZOLE (METROGEL) 0.75 % vaginal gel 0 Sig: Insert 1 Applicator into vagina daily for 5 days. Class: Normal  
 Pharmacy: [address] Ph #: [phone] Route: Vaginal  
  
We Performed the Following HEPATITIS SCREENING PANEL [[id number] Custom] HIV 1/2 AG/AB, 4TH GENERATION,W RFLX CONFIRM [id number] CPT(R)] [address] [id number] Custom] T PALLIDUM SCREEN W/REFLEX [[id number] Custom] Follow-up Instructions Return if symptoms worsen or fail to improve. Introducing [facility]! Mireya Donaldson introduces [employer] patient portal. Now you can access parts of your medical record, email your doctor's office, and request medication refills online. 1. In your internet browser, go to [email or web address] 2. Click on the First Time User? Click Here link in the Sign In box. You will see the New Member Sign Up page. 3. Enter your [employer] Access Code exactly as it appears below. You will not need to use this code after youve completed the sign-up process.  If you do not sign up before the expiration date, you must request a new code. · [employer] Access Code: [email or web address] Expires: 7/1/2018 12:59 PM 
 
4. Enter the last four digits of your Social Security Number (xxxx) and Date of Birth (mm/dd/yyyy) as indicated and click Submit. You will be taken to the next sign-up page. 5. Create a [employer] ID. This will be your [employer] login ID and cannot be changed, so think of one that is secure and easy to remember. 6. Create a [employer] password. You can change your password at any time. 7. Enter your Password Reset Question and Answer. This can be used at a later time if you forget your password. 8. Enter your e-mail address. You will receive e-mail notification when new information is available in [address] 9. Click Sign Up. You can now view and download portions of your medical record. 10. Click the Download Summary menu link to download a portable copy of your medical information. If you have questions, please visit the Frequently Asked Questions section of the [employer] website. Remember, [employer] is NOT to be used for urgent needs. For medical emergencies, dial 911. Now available from your iPhone and Android! Please provide this summary of care documentation to your next provider. Your primary care clinician is listed as Kennedi Gallagher. If you have any questions after today's visit, please call [phone]. 0

## 2021-01-20 NOTE — OB PROVIDER TRIAGE NOTE - PSH
(induced) termination of pregnancy with unspecified complications    H/O bariatric surgery  Gastric sleeve 2018  History of D&C    History of herpes labialis  Valtrex 500mg po bid  History of myomectomy     (induced) termination of pregnancy with unspecified complications    H/O bariatric surgery  Gastric sleeve 2018  History of D&C    History of herpes labialis  Valtrex 500mg po bid  Status post hysteroscopic myomectomy

## 2021-01-20 NOTE — OB PROVIDER TRIAGE NOTE - LIVING CHILDREN, OB PROFILE
[facility]    DATE OF SERVICE:  11/09/2017    SUBJECTIVE:  I am seeing Mr. Lynch as a followup to his [facility] emergency room visit.  He had become very weak, fatigued, complained of mild abdominal discomfort, for about 24 to 48 hours prior to him being sent to the emergency room.  In the emergency room, his white cell count was at 29,000.  He has a history of chronic lymphocytic leukemia.  We had also drawn labs this week at the nursing home and his white count was at 36,000 and last August it was at 30,000.  If we go back early in the year, it had been as low as 17,000 to 18,000 but two years ago he was running in the 30,000 range.  He has the known chronic lymphocytic leukemia and has not wanted to follow up any further with oncology.  His platelet count has consistently been in the 70,000 to 80,000 range and his hemoglobin has been in the 11 to 12 range.  In the emergency room, an x-ray was taken with no acute abnormality.  He was diagnosed with likely being dehydrated.  We are having some difficulty determining whether he was given fluid during that ER visit.  His family says he was not.  There was another report that the nursing staff from the nursing home had that he had gotten fluid.  Dehydration is listed as the diagnosis of the ER visit but when I had a staff member from my office read the report, they could not find whether fluid was given.  So uncertain about that.  However, since he's been back at the nursing home, he's done very well.  His energy seems better.  His abdominal discomfort is improved.  He tells me that his only problem really is boredom.  He says that he is used to doing something all the time and it's hard for him to just stay sitting at the nursing home.      ROS:  Otherwise his review of systems, he denies chest pain, palpitations, shortness of breath.  Denies any abdominal discomfort.  No bowel or bladder problems.  Leg swelling is unchanged and he's wearing  support stockings.  As I walked down the dinh and then back past his room again, he started walking without his walker.  The physical therapist had caught that and they encouraged him to use his walker but that's been an issue where he's not wanting to use the walker.      OBJECTIVE:  His blood pressure is 128/60.  Pulse 61.  Respirations 18.  Temp of 98.4.  O2 sat 97% room air.  Labs as above.  HEENT:  Normocephalic.  Atraumatic.  Normal conjunctiva.  No rhinorrhea or congestion noted.  Neck is supple.  No adenopathy.  Heart is in regular rhythm without gallops or rubs.  Lungs are clear.  No wheeze, rhonchi or rales.  Abdomen is obese but soft, nontender.  There's no mass, megaly, guarding or rigidity.  Extremities with the MELONY hose present.  There's edema that's noted but not any different than it's been recently.  Neuro:  He's got the resting tremor consistent with the Parkinson's disease but no other asymmetry at the time.  Mental status is much better than I had seen last time.  He was very quiet the last time I saw him.  He's talkative today.  He points out the new shelving that he has in his room, and some toy semis that he has up there, so much improved.  Musculoskeletal is without joint redness, swelling or warmth.      ASSESSMENT:  1. Weakness.  Dehydration.  Peripheral edema.  Chronic lymphocytic leukemia.  Chronic anemia secondary to the CLL.  Parkinson's disease.  Diabetes mellitus.  History of prostate cancer.    Coronary artery disease - appears clinically stable.      PLAN:  Plan then will be observation at this point.  I did have my nursing staff from the office contact a member who was curious about a change in CBC to relay that this is stable over the last couple months and really over the last couple of years with some better numbers early in this year.  I'll plan to see Mr. Lynch in the next 1 to 2 months or before as needed otherwise.    SS:sedrick Boss  RICKI Schmid  D:  11/09/2017  T:  11/13/2017  Electronically signed by:Tatianna Braun   Nov 13 2017 10:19AM CST        Electronically signed by:PILAR SCHMID M.D.  Nov 13 2017 12:53PM CST     0

## 2021-01-20 NOTE — OB RN TRIAGE NOTE - PSH
(induced) termination of pregnancy with unspecified complications    History of herpes labialis  Valtrex 500mg po bid  History of myomectomy

## 2021-01-20 NOTE — OB RN TRIAGE NOTE - NS_OBGYNHISTORY_OBGYN_ALL_OB_FT
Incomplete SAB X1  Surgical ETOP x1  HSV II, Last outbreak 3 weeks ago.   h/o fibroids, s/p Lap. Myomectomy (pt states cleared by Dr Caba for vaginal delivery)  Pt denies any h/o: Abn. PAP, ovarian cysts, breast problems

## 2021-01-21 ENCOUNTER — TRANSCRIPTION ENCOUNTER (OUTPATIENT)
Age: 41
End: 2021-01-21

## 2021-01-21 LAB
ALBUMIN SERPL ELPH-MCNC: 3.7 G/DL — SIGNIFICANT CHANGE UP (ref 3.3–5)
ALP SERPL-CCNC: 137 U/L — HIGH (ref 40–120)
ALT FLD-CCNC: 15 U/L — SIGNIFICANT CHANGE UP (ref 4–33)
ANION GAP SERPL CALC-SCNC: 13 MMOL/L — SIGNIFICANT CHANGE UP (ref 7–14)
APTT BLD: 26.1 SEC — LOW (ref 27–36.3)
AST SERPL-CCNC: 18 U/L — SIGNIFICANT CHANGE UP (ref 4–32)
BASOPHILS # BLD AUTO: 0.01 K/UL — SIGNIFICANT CHANGE UP (ref 0–0.2)
BASOPHILS NFR BLD AUTO: 0.1 % — SIGNIFICANT CHANGE UP (ref 0–2)
BILIRUB SERPL-MCNC: 0.3 MG/DL — SIGNIFICANT CHANGE UP (ref 0.2–1.2)
BUN SERPL-MCNC: 8 MG/DL — SIGNIFICANT CHANGE UP (ref 7–23)
CALCIUM SERPL-MCNC: 9.2 MG/DL — SIGNIFICANT CHANGE UP (ref 8.4–10.5)
CHLORIDE SERPL-SCNC: 98 MMOL/L — SIGNIFICANT CHANGE UP (ref 98–107)
CO2 SERPL-SCNC: 20 MMOL/L — LOW (ref 22–31)
CREAT SERPL-MCNC: 0.56 MG/DL — SIGNIFICANT CHANGE UP (ref 0.5–1.3)
EOSINOPHIL # BLD AUTO: 0 K/UL — SIGNIFICANT CHANGE UP (ref 0–0.5)
EOSINOPHIL NFR BLD AUTO: 0 % — SIGNIFICANT CHANGE UP (ref 0–6)
FIBRINOGEN PPP-MCNC: 586 MG/DL — HIGH (ref 290–520)
GLUCOSE SERPL-MCNC: 98 MG/DL — SIGNIFICANT CHANGE UP (ref 70–99)
HCT VFR BLD CALC: 33.5 % — LOW (ref 34.5–45)
HGB BLD-MCNC: 10.9 G/DL — LOW (ref 11.5–15.5)
IANC: 7.81 K/UL — SIGNIFICANT CHANGE UP (ref 1.5–8.5)
IMM GRANULOCYTES NFR BLD AUTO: 0.2 % — SIGNIFICANT CHANGE UP (ref 0–1.5)
INR BLD: 0.95 RATIO — SIGNIFICANT CHANGE UP (ref 0.88–1.16)
LDH SERPL L TO P-CCNC: 167 U/L — SIGNIFICANT CHANGE UP (ref 135–225)
LYMPHOCYTES # BLD AUTO: 1.42 K/UL — SIGNIFICANT CHANGE UP (ref 1–3.3)
LYMPHOCYTES # BLD AUTO: 14.3 % — SIGNIFICANT CHANGE UP (ref 13–44)
MCHC RBC-ENTMCNC: 27.6 PG — SIGNIFICANT CHANGE UP (ref 27–34)
MCHC RBC-ENTMCNC: 32.5 GM/DL — SIGNIFICANT CHANGE UP (ref 32–36)
MCV RBC AUTO: 84.8 FL — SIGNIFICANT CHANGE UP (ref 80–100)
MONOCYTES # BLD AUTO: 0.66 K/UL — SIGNIFICANT CHANGE UP (ref 0–0.9)
MONOCYTES NFR BLD AUTO: 6.7 % — SIGNIFICANT CHANGE UP (ref 2–14)
NEUTROPHILS # BLD AUTO: 7.81 K/UL — HIGH (ref 1.8–7.4)
NEUTROPHILS NFR BLD AUTO: 78.7 % — HIGH (ref 43–77)
NRBC # BLD: 0 /100 WBCS — SIGNIFICANT CHANGE UP
NRBC # FLD: 0 K/UL — SIGNIFICANT CHANGE UP
PLATELET # BLD AUTO: 240 K/UL — SIGNIFICANT CHANGE UP (ref 150–400)
POTASSIUM SERPL-MCNC: 3.9 MMOL/L — SIGNIFICANT CHANGE UP (ref 3.5–5.3)
POTASSIUM SERPL-SCNC: 3.9 MMOL/L — SIGNIFICANT CHANGE UP (ref 3.5–5.3)
PROT SERPL-MCNC: 6.7 G/DL — SIGNIFICANT CHANGE UP (ref 6–8.3)
PROTHROM AB SERPL-ACNC: 10.9 SEC — SIGNIFICANT CHANGE UP (ref 10.6–13.6)
RBC # BLD: 3.95 M/UL — SIGNIFICANT CHANGE UP (ref 3.8–5.2)
RBC # FLD: 16.6 % — HIGH (ref 10.3–14.5)
SARS-COV-2 IGG SERPL QL IA: NEGATIVE — SIGNIFICANT CHANGE UP
SARS-COV-2 IGM SERPL IA-ACNC: <3.8 AU/ML — SIGNIFICANT CHANGE UP
SODIUM SERPL-SCNC: 131 MMOL/L — LOW (ref 135–145)
T PALLIDUM AB TITR SER: NEGATIVE — SIGNIFICANT CHANGE UP
URATE SERPL-MCNC: 3.2 MG/DL — SIGNIFICANT CHANGE UP (ref 2.5–7)
WBC # BLD: 9.92 K/UL — SIGNIFICANT CHANGE UP (ref 3.8–10.5)
WBC # FLD AUTO: 9.92 K/UL — SIGNIFICANT CHANGE UP (ref 3.8–10.5)

## 2021-01-21 PROCEDURE — 59514 CESAREAN DELIVERY ONLY: CPT | Mod: U9

## 2021-01-21 RX ORDER — FERROUS SULFATE 325(65) MG
325 TABLET ORAL DAILY
Refills: 0 | Status: DISCONTINUED | OUTPATIENT
Start: 2021-01-21 | End: 2021-01-21

## 2021-01-21 RX ORDER — TETANUS TOXOID, REDUCED DIPHTHERIA TOXOID AND ACELLULAR PERTUSSIS VACCINE, ADSORBED 5; 2.5; 8; 8; 2.5 [IU]/.5ML; [IU]/.5ML; UG/.5ML; UG/.5ML; UG/.5ML
0.5 SUSPENSION INTRAMUSCULAR ONCE
Refills: 0 | Status: DISCONTINUED | OUTPATIENT
Start: 2021-01-21 | End: 2021-01-21

## 2021-01-21 RX ORDER — VANCOMYCIN HCL 1 G
1000 VIAL (EA) INTRAVENOUS EVERY 12 HOURS
Refills: 0 | Status: DISCONTINUED | OUTPATIENT
Start: 2021-01-21 | End: 2021-01-21

## 2021-01-21 RX ORDER — VANCOMYCIN HCL 1 G
2000 VIAL (EA) INTRAVENOUS EVERY 8 HOURS
Refills: 0 | Status: DISCONTINUED | OUTPATIENT
Start: 2021-01-21 | End: 2021-01-21

## 2021-01-21 RX ORDER — DIPHENHYDRAMINE HCL 50 MG
25 CAPSULE ORAL EVERY 6 HOURS
Refills: 0 | Status: DISCONTINUED | OUTPATIENT
Start: 2021-01-21 | End: 2021-01-21

## 2021-01-21 RX ORDER — LANOLIN
1 OINTMENT (GRAM) TOPICAL EVERY 6 HOURS
Refills: 0 | Status: DISCONTINUED | OUTPATIENT
Start: 2021-01-21 | End: 2021-01-21

## 2021-01-21 RX ORDER — VANCOMYCIN HCL 1 G
1000 VIAL (EA) INTRAVENOUS ONCE
Refills: 0 | Status: DISCONTINUED | OUTPATIENT
Start: 2021-01-21 | End: 2021-01-21

## 2021-01-21 RX ORDER — OXYCODONE HYDROCHLORIDE 5 MG/1
5 TABLET ORAL
Refills: 0 | Status: DISCONTINUED | OUTPATIENT
Start: 2021-01-21 | End: 2021-01-22

## 2021-01-21 RX ORDER — VANCOMYCIN HCL 1 G
VIAL (EA) INTRAVENOUS
Refills: 0 | Status: DISCONTINUED | OUTPATIENT
Start: 2021-01-21 | End: 2021-01-21

## 2021-01-21 RX ORDER — ACETAMINOPHEN 500 MG
975 TABLET ORAL
Refills: 0 | Status: DISCONTINUED | OUTPATIENT
Start: 2021-01-21 | End: 2021-01-21

## 2021-01-21 RX ORDER — MAGNESIUM HYDROXIDE 400 MG/1
30 TABLET, CHEWABLE ORAL
Refills: 0 | Status: DISCONTINUED | OUTPATIENT
Start: 2021-01-21 | End: 2021-01-21

## 2021-01-21 RX ORDER — ACETAMINOPHEN 500 MG
3 TABLET ORAL
Qty: 0 | Refills: 0 | DISCHARGE
Start: 2021-01-21

## 2021-01-21 RX ORDER — OXYCODONE HYDROCHLORIDE 5 MG/1
5 TABLET ORAL ONCE
Refills: 0 | Status: DISCONTINUED | OUTPATIENT
Start: 2021-01-21 | End: 2021-01-22

## 2021-01-21 RX ORDER — CITRIC ACID/SODIUM CITRATE 300-500 MG
30 SOLUTION, ORAL ORAL ONCE
Refills: 0 | Status: DISCONTINUED | OUTPATIENT
Start: 2021-01-21 | End: 2021-01-21

## 2021-01-21 RX ORDER — DIPHENHYDRAMINE HCL 50 MG
25 CAPSULE ORAL EVERY 4 HOURS
Refills: 0 | Status: DISCONTINUED | OUTPATIENT
Start: 2021-01-21 | End: 2021-01-22

## 2021-01-21 RX ORDER — HEPARIN SODIUM 5000 [USP'U]/ML
5000 INJECTION INTRAVENOUS; SUBCUTANEOUS EVERY 8 HOURS
Refills: 0 | Status: DISCONTINUED | OUTPATIENT
Start: 2021-01-21 | End: 2021-01-21

## 2021-01-21 RX ORDER — SODIUM CHLORIDE 9 MG/ML
500 INJECTION, SOLUTION INTRAVENOUS ONCE
Refills: 0 | Status: COMPLETED | OUTPATIENT
Start: 2021-01-21 | End: 2021-01-21

## 2021-01-21 RX ORDER — ONDANSETRON 8 MG/1
4 TABLET, FILM COATED ORAL ONCE
Refills: 0 | Status: DISCONTINUED | OUTPATIENT
Start: 2021-01-21 | End: 2021-01-21

## 2021-01-21 RX ORDER — SIMETHICONE 80 MG/1
80 TABLET, CHEWABLE ORAL EVERY 4 HOURS
Refills: 0 | Status: DISCONTINUED | OUTPATIENT
Start: 2021-01-21 | End: 2021-01-21

## 2021-01-21 RX ORDER — FAMOTIDINE 10 MG/ML
20 INJECTION INTRAVENOUS ONCE
Refills: 0 | Status: DISCONTINUED | OUTPATIENT
Start: 2021-01-21 | End: 2021-01-21

## 2021-01-21 RX ORDER — IBUPROFEN 200 MG
1 TABLET ORAL
Qty: 0 | Refills: 0 | DISCHARGE
Start: 2021-01-21

## 2021-01-21 RX ORDER — OXYTOCIN 10 UNIT/ML
333.33 VIAL (ML) INJECTION
Qty: 20 | Refills: 0 | Status: DISCONTINUED | OUTPATIENT
Start: 2021-01-21 | End: 2021-01-21

## 2021-01-21 RX ORDER — KETOROLAC TROMETHAMINE 30 MG/ML
30 SYRINGE (ML) INJECTION EVERY 6 HOURS
Refills: 0 | Status: DISCONTINUED | OUTPATIENT
Start: 2021-01-21 | End: 2021-01-22

## 2021-01-21 RX ORDER — NALOXONE HYDROCHLORIDE 4 MG/.1ML
0.1 SPRAY NASAL
Refills: 0 | Status: DISCONTINUED | OUTPATIENT
Start: 2021-01-21 | End: 2021-01-22

## 2021-01-21 RX ORDER — SENNA PLUS 8.6 MG/1
1 TABLET ORAL
Refills: 0 | Status: DISCONTINUED | OUTPATIENT
Start: 2021-01-21 | End: 2021-01-21

## 2021-01-21 RX ORDER — METOCLOPRAMIDE HCL 10 MG
10 TABLET ORAL ONCE
Refills: 0 | Status: DISCONTINUED | OUTPATIENT
Start: 2021-01-21 | End: 2021-01-21

## 2021-01-21 RX ORDER — HYDROMORPHONE HYDROCHLORIDE 2 MG/ML
1 INJECTION INTRAMUSCULAR; INTRAVENOUS; SUBCUTANEOUS
Refills: 0 | Status: DISCONTINUED | OUTPATIENT
Start: 2021-01-21 | End: 2021-01-21

## 2021-01-21 RX ORDER — SODIUM CHLORIDE 9 MG/ML
1000 INJECTION, SOLUTION INTRAVENOUS
Refills: 0 | Status: DISCONTINUED | OUTPATIENT
Start: 2021-01-21 | End: 2021-01-21

## 2021-01-21 RX ORDER — HYDROMORPHONE HYDROCHLORIDE 2 MG/ML
0.5 INJECTION INTRAMUSCULAR; INTRAVENOUS; SUBCUTANEOUS
Refills: 0 | Status: DISCONTINUED | OUTPATIENT
Start: 2021-01-21 | End: 2021-01-21

## 2021-01-21 RX ORDER — HYDROMORPHONE HYDROCHLORIDE 2 MG/ML
30 INJECTION INTRAMUSCULAR; INTRAVENOUS; SUBCUTANEOUS
Refills: 0 | Status: DISCONTINUED | OUTPATIENT
Start: 2021-01-21 | End: 2021-01-22

## 2021-01-21 RX ORDER — IBUPROFEN 200 MG
600 TABLET ORAL EVERY 6 HOURS
Refills: 0 | Status: COMPLETED | OUTPATIENT
Start: 2021-01-21 | End: 2021-12-20

## 2021-01-21 RX ORDER — CITRIC ACID/SODIUM CITRATE 300-500 MG
15 SOLUTION, ORAL ORAL EVERY 6 HOURS
Refills: 0 | Status: DISCONTINUED | OUTPATIENT
Start: 2021-01-21 | End: 2021-01-21

## 2021-01-21 RX ORDER — ONDANSETRON 8 MG/1
4 TABLET, FILM COATED ORAL EVERY 6 HOURS
Refills: 0 | Status: DISCONTINUED | OUTPATIENT
Start: 2021-01-21 | End: 2021-01-22

## 2021-01-21 RX ORDER — HYDROMORPHONE HYDROCHLORIDE 2 MG/ML
0.5 INJECTION INTRAMUSCULAR; INTRAVENOUS; SUBCUTANEOUS
Refills: 0 | Status: DISCONTINUED | OUTPATIENT
Start: 2021-01-21 | End: 2021-01-22

## 2021-01-21 RX ADMIN — SODIUM CHLORIDE 2000 MILLILITER(S): 9 INJECTION, SOLUTION INTRAVENOUS at 03:30

## 2021-01-21 RX ADMIN — Medication 975 MILLIGRAM(S): at 14:57

## 2021-01-21 RX ADMIN — HEPARIN SODIUM 5000 UNIT(S): 5000 INJECTION INTRAVENOUS; SUBCUTANEOUS at 14:59

## 2021-01-21 RX ADMIN — Medication 30 MILLIGRAM(S): at 21:12

## 2021-01-21 RX ADMIN — Medication 30 MILLIGRAM(S): at 10:55

## 2021-01-21 RX ADMIN — SODIUM CHLORIDE 125 MILLILITER(S): 9 INJECTION, SOLUTION INTRAVENOUS at 05:57

## 2021-01-21 RX ADMIN — SODIUM CHLORIDE 1000 MILLILITER(S): 9 INJECTION, SOLUTION INTRAVENOUS at 05:47

## 2021-01-21 NOTE — DISCHARGE NOTE OB - MEDICATION SUMMARY - MEDICATIONS TO TAKE
I will START or STAY ON the medications listed below when I get home from the hospital:    acetaminophen 325 mg oral tablet  -- 3 tab(s) by mouth   -- Indication: For postpartum    ibuprofen 600 mg oral tablet  -- 1 tab(s) by mouth every 6 hours  -- Indication: For postpartum    Valtrex  -- Indication: For Hsv    PNV Prenatal  -- Indication: For postpartum   I will START or STAY ON the medications listed below when I get home from the hospital:    ibuprofen 600 mg oral tablet  -- 1 tab(s) by mouth every 6 hours, As Needed  -- Indication: For pain    acetaminophen 325 mg oral tablet  -- 3 tab(s) by mouth , As Needed  -- Indication: For pain    Valtrex  -- Indication: For Hsv    PNV Prenatal  -- Indication: For postpartum

## 2021-01-21 NOTE — OB PROVIDER LABOR PROGRESS NOTE - NS_OBIHIFHRDETAILS_OBGYN_ALL_OB_FT
130/mod/+acels,+citlali,+late
130/mod/+acels, +decels (possible late - difficult to trace)
125/mod/+acels

## 2021-01-21 NOTE — CHART NOTE - NSCHARTNOTEFT_GEN_A_CORE
OB Attending Progress Note    Patient seen and evaluated at bedside for fetal decels.  Denies complaints.  Comfortable w/ epidural.      T(C): 36.7 (01-21-21 @ 04:12), Max: 37.0 (01-20-21 @ 19:20)  HR: 96 (01-21-21 @ 05:55) (57 - 105)  BP: 119/58 (01-21-21 @ 05:43) (107/59 - 158/81)  RR: 18 (01-20-21 @ 18:57) (15 - 18)  SpO2: 100% (01-21-21 @ 05:55) (80% - 100%)    SVE: 6/80/-3, Pink balloon out     EFM: 140/mod/-accels/+decels, variables and lates  [place]:  q6mins    A/P 40y P0 admitted for IOL for preE w/o SF      -Labor: making cervical change s/p PO cytotec, CB. Will plan for AROM and pit when FH recovers  -Fetal Status: Category 2 but with moderate variability. Decels possibly due to fast cervical change. Will continue intrauterine resuscitation and re-evaluate. Discussed with patient the possible need for PCS if decels continue   -GBS: Positive on ampicillin  -Analgesia: epidural in place  -preE w/o SF - BPs normal to mild range. HELLP labs negative     RAMANDEEP Carrasco MD

## 2021-01-21 NOTE — OB RN DELIVERY SUMMARY - NS_SEPSISRSKCALC_OBGYN_ALL_OB_FT
EOS calculated successfully. EOS Risk Factor: 0.5/1000 live births ([facility] national incidence); GA=40w6d; Temp=98.6; ROM=0.783; GBS='Positive'; Antibiotics='Broad spectrum antibiotics > 4 hrs prior to birth'

## 2021-01-21 NOTE — DISCHARGE NOTE OB - CARE PLAN
Principal Discharge DX:	 delivery delivered  Goal:	Routine post operative care  Assessment and plan of treatment:	Nothing per vagina x 6 weeks. No tub soaking or heavy lifting

## 2021-01-21 NOTE — DISCHARGE NOTE OB - MATERIALS PROVIDED
Vaccinations/[facility] Screening Program/[facility]  Immunization Record/Breastfeeding Log/Bottle Feeding Log/Breastfeeding Mother’s Support Group Information/Guide to Postpartum Care/[facility] Hearing Screen Program/Shaken Baby Prevention Handout/Breastfeeding Guide and Packet/Birth Certificate Instructions/Discharge Medication Information for Patients and Families Pocket Guide/MMR Vaccination (VIS Pub Date: 2012)/Tdap Vaccination (VIS Pub Date: 2012)

## 2021-01-21 NOTE — OB RN DELIVERY SUMMARY - NS_LABORCHARACTER_OBGYN_ALL_OB
Induction of labor-AROM/Induction of labor-Medicinal/Internal electronic FM/External electronic FM/Antibiotics in labor/Meconium staining

## 2021-01-21 NOTE — DISCHARGE NOTE OB - HOSPITAL COURSE
The patient presented in early labor. Was found to have preE w/o SF. She underwent a  on  for NRFHT.

## 2021-01-21 NOTE — CHART NOTE - NSCHARTNOTEFT_GEN_A_CORE
NP note    pt seen for Cat II tracing. S/P cervical balloon displacement and made rapid change.     T(C): 36.7 (2021 04:12), Max: 37.0 (2021 19:20)  T(F): 98.06 (2021 04:12), Max: 98.6 (2021 19:20)  HR: 67 (2021 07:15) (57 - 105)  BP: 122/63 (2021 07:14) (102/53 - 158/81)  RR: 18 (2021 18:57) (15 - 18)  SpO2: 100% (2021 07:20) (80% - 100%)  /mod citlali/- accels/variable/late decels  [employer] irregular  SVE 6/80/-2    AROM light mec    IUPC/ISE placed without incident. Pt tolerated well.     41 y/o  @40+6 PEC IOL sp PO/CB now in active labor with Cat II tracing    -Amnioinfusion for recurrent variables  -Continue resuscitation efforts  -D/W Dr. Joshua at bedside    GEORGINA nash

## 2021-01-21 NOTE — OB PROVIDER DELIVERY SUMMARY - NSPROVIDERDELIVERYNOTE_OBGYN_ALL_OB_FT
b/l wnl tubes and ovaries  no evidence intraabdominal disease   good hemostasis  , IVF 1500  b/l wnl tubes and ovaries  no evidence intraabdominal disease   Loose nuchal x 1  good hemostasis  , IVF 1500

## 2021-01-21 NOTE — DISCHARGE NOTE OB - PATIENT PORTAL LINK FT
You can access the FollowMyHealth Patient Portal offered by [facility] by registering at the following website: [email or web address]. By joining [employer]’s FollowMyHealth portal, you will also be able to view your health information using other applications (apps) compatible with our system.

## 2021-01-21 NOTE — OB PROVIDER LABOR PROGRESS NOTE - NS_SUBJECTIVE/OBJECTIVE_OBGYN_ALL_OB_FT
R1 OB Labor Note    S: Patient seen and examined at bedside. Denies headaches, fevers, chills, changes in vision, nausea, vomiting, or RUQ pain.     T(C): 36.9 (01-21-21 @ 00:11), Max: 37.0 (01-20-21 @ 19:20)  HR: 71 (01-21-21 @ 02:19) (57 - 102)  BP: 147/65 (01-21-21 @ 01:54) (107/59 - 147/65)  BP(mean): --  ABP: --  ABP(mean): --  RR: 18 (01-20-21 @ 18:57) (15 - 18)  SpO2: 96% (01-21-21 @ 02:19) (89% - 100%)  Wt(kg): --  CVP(mm Hg): --  CI: --  CAPILLARY BLOOD GLUCOSE       N/A      01-20 @ 07:01  -  01-21 @ 02:21  --------------------------------------------------------  IN:    IV PiggyBack: 500 mL    Lactated Ringers: 875 mL  Total IN: 1375 mL    OUT:  Total OUT: 0 mL    Total NET: 1375 mL
R1 OB Labor Note    S: Patient seen and examined at bedside.     T(C): 37.0 (01-20-21 @ 19:20), Max: 37.0 (01-20-21 @ 19:20)  HR: 72 (01-20-21 @ 21:54) (57 - 102)  BP: 140/59 (01-20-21 @ 21:54) (107/59 - 146/86)  BP(mean): --  ABP: --  ABP(mean): --  RR: 15 (01-20-21 @ 14:23) (15 - 15)  SpO2: 91% (01-20-21 @ 21:57) (91% - 99%)  Wt(kg): --  CVP(mm Hg): --  CI: --  CAPILLARY BLOOD GLUCOSE       N/A
R1 OB Labor Note    S: Patient seen and examined at bedside for placement of CB.    T(C): 36.7 (01-21-21 @ 04:12), Max: 37.0 (01-20-21 @ 19:20)  HR: 84 (01-21-21 @ 04:35) (57 - 105)  BP: 135/61 (01-21-21 @ 04:28) (107/59 - 158/81)  BP(mean): --  ABP: --  ABP(mean): --  RR: 18 (01-20-21 @ 18:57) (15 - 18)  SpO2: 100% (01-21-21 @ 04:35) (80% - 100%)  Wt(kg): --  CVP(mm Hg): --  CI: --  CAPILLARY BLOOD GLUCOSE       N/A      01-20 @ 07:01  -  01-21 @ 04:40  --------------------------------------------------------  IN:    IV PiggyBack: 500 mL    Lactated Ringers: 875 mL  Total IN: 1375 mL    OUT:  Total OUT: 0 mL    Total NET: 1375 mL

## 2021-01-21 NOTE — OB PROVIDER DELIVERY SUMMARY - NSPLACINTACT_OBGYN_ALL_OB
Subjective:      History was provided by the mother  Yovana Murguia is a 5 days male who was brought in for this well child visit  Birth History    Birth     Length: 18 5" (47 cm)     Weight: 3560 g (7 lb 13 6 oz)    Apgar     One: 9 0     Five: 9 0    Delivery Method: Vaginal, Spontaneous    Gestation Age: 45 3/7 wks    Duration of Labor: 2nd: 8m     The following portions of the patient's history were reviewed and updated as appropriate: allergies, current medications, past family history, past medical history, past social history, past surgical history and problem list     Birthweight: 3560 g (7 lb 13 6 oz)  Discharge weight: 7 6  Weight change since birth: -6%    Hepatitis B vaccination:   Immunization History   Administered Date(s) Administered    Hep B, Adolescent or Pediatric 2021       Mother's blood type:   ABO Grouping   Date Value Ref Range Status   2021 O  Final     Rh Factor   Date Value Ref Range Status   2021 Positive  Final      Baby's blood type:   ABO Grouping   Date Value Ref Range Status   2021 O  Final     Rh Factor   Date Value Ref Range Status   2021 Positive  Final     Bilirubin:   Total Bilirubin   Date Value Ref Range Status   2021 6 00 - 7 00 mg/dL Final     Comment:     Use of this assay is not recommended for patients undergoing treatment with eltrombopag due to the potential for falsely elevated results  Hearing screen:      CCHD screen:       Maternal Information   PTA medications:   No medications prior to admission  Maternal social history: no       Current Issues:  Current concerns: none  Review of  Issues:  Known potentially teratogenic medications used during pregnancy? no  Alcohol during pregnancy?  no  Tobacco during pregnancy? no  Other drugs during pregnancy? no  Other complications during pregnancy, labor, or delivery? no  Was mom Hepatitis B surface antigen positive? no    Review of Nutrition:  Current diet: breast milk  Current feeding patterns: Every 2 hours   Difficulties with feeding? no  Current stooling frequency: more than 5 times a day    Social Screening:  Current child-care arrangements: in home: primary caregiver is mother  Sibling relations: sisters: 1 sister  Parental coping and self-care: doing well; no concerns  Secondhand smoke exposure? no          Objective:     Growth parameters are noted and are appropriate for age  Wt Readings from Last 1 Encounters:   02/08/21 3342 g (7 lb 5 9 oz) (44 %, Z= -0 16)*     * Growth percentiles are based on WHO (Boys, 0-2 years) data  Ht Readings from Last 1 Encounters:   02/06/21 18 5" (47 cm) (6 %, Z= -1 53)*     * Growth percentiles are based on WHO (Boys, 0-2 years) data  There were no vitals filed for this visit  Physical Exam  Vitals signs and nursing note reviewed  Constitutional:       General: He is active  HENT:      Head: Anterior fontanelle is flat  Eyes:      Conjunctiva/sclera: Conjunctivae normal       Pupils: Pupils are equal, round, and reactive to light  Neck:      Musculoskeletal: Normal range of motion and neck supple  Cardiovascular:      Rate and Rhythm: Regular rhythm  Heart sounds: S1 normal and S2 normal    Pulmonary:      Effort: Pulmonary effort is normal       Breath sounds: Normal breath sounds  Abdominal:      Palpations: Abdomen is soft  Genitourinary:     Penis: Normal and circumcised  Scrotum/Testes: Normal       Comments: T  1  Testes desc bilateral    Musculoskeletal: Normal range of motion  Comments: No scoliosis   Skin:     General: Skin is warm  Capillary Refill: Capillary refill takes less than 2 seconds  Turgor: Normal    Neurological:      General: No focal deficit present  Mental Status: He is alert  Primitive Reflexes: Suck normal  Symmetric [place]  Assessment:     9 days male infant  No diagnosis found  Plan:         1  Anticipatory guidance discussed  Gave handout on well-child issues at this age  2  Screening tests:   a  State  metabolic screen: na  b  Hearing screen (OAE, ABR): negative    3  Ultrasound of the hips to screen for developmental dysplasia of the hip: not applicable    4  Immunizations today: per orders  Vaccine Counseling: Discussed with: Ped parent/guardian: mother  5  Follow-up visit in 1 month for next well child visit, or sooner as needed  Yes

## 2021-01-21 NOTE — CHART NOTE - NSCHARTNOTEFT_GEN_A_CORE
OBGYN Attending    FHR tracing reviewed.  Deep variables noted; IUPC placed for amnioinfusion.  Discussed Primary CS if FHR tracing does not improve.  Pt verbalized understanding.    Moni Joshua MD

## 2021-01-21 NOTE — OB NEONATOLOGY/PEDIATRICIAN DELIVERY SUMMARY - NSPEDSNEONOTESA_OBGYN_ALL_OB_FT
Baby is a 40.6 wk GA F born to a 39 y/o  mother via C/S for nrFHR. Maternal history HSV on valtrex, last outbreak 3 weeks ago, neg SSE. Hx of anemia with multiple iron transfusions. Prenatal history uncomplicated. Maternal BT A+. PNL neg, NR, and immune. GBS pos . Received 1 dose amp. AROM at 0707 on , light mec. Baby born vigorous and crying spontaneously. WDSS. Apgars 8/9. EOS 0.04. Mom plans to breastfeed, would like hepB. COVID status neg

## 2021-01-21 NOTE — DISCHARGE NOTE OB - CARE PROVIDER_API CALL
Annie Carrasco)  Gertrudis CERDA  [address] [address] [place] [address]  Phone: [phone]  Fax: [phone]  Follow Up Time:

## 2021-01-21 NOTE — OB PROVIDER LABOR PROGRESS NOTE - ASSESSMENT
A/P:   - Labor: IOL for gHTN with PO. Has not yet started PO course.  - Fetus: Cat 1  - Pain: morphine 4/4    Татьяна Choudhury, PGY-1  d/w Dr. Almaraz  
A/P:   - Labor: IOL for gHTN, now with PEC. Continue PO. CB placed.  - Fetus: Cat 2  - GBS: + on vanc  - Pain: Epi in situ    Татьяна Choudhury, PGY-1  d/w Dr. Carrasco 
A/P:   - Labor: IOL for gHTN, now with PEC. Discussed dx with pt. Currently asymptomatic with no s/o severe features. Continue PO.  - Fetus: Cat 2  - GBS: + on vanc  - Pain: Epi pending    Татьяна Choudhury, PGY-1  d/w Dr. Carrasco

## 2021-01-21 NOTE — OB PROVIDER DELIVERY SUMMARY - NSCSDELIVERYA_OBGYN_ALL_OB
Patient calls stating she sees Dr. Winter for her shoulder. Patient reports on Monday she was cleared to return to work with no restrictions for Tuesday. On Tuesday she over-worked the arm and was sore which caused her to miss work Wednesday. Wednesday night she fell, brusied her hip, and braced herself with the bad arm. Patient states because she missed three days of work per policy she needs a letter to return to work. Patient informed Dr. Winter is out of office until Tuesday. Patient has appointment with PCP on Monday and will discuss work letter then.    Unscheduled

## 2021-01-22 ENCOUNTER — APPOINTMENT (OUTPATIENT)
Dept: ANTEPARTUM | Facility: CLINIC | Age: 41
End: 2021-01-22

## 2021-01-22 ENCOUNTER — APPOINTMENT (OUTPATIENT)
Dept: OBGYN | Facility: CLINIC | Age: 41
End: 2021-01-22

## 2021-01-22 LAB
BASOPHILS # BLD AUTO: 0.02 K/UL — SIGNIFICANT CHANGE UP (ref 0–0.2)
BASOPHILS NFR BLD AUTO: 0.2 % — SIGNIFICANT CHANGE UP (ref 0–2)
EOSINOPHIL # BLD AUTO: 0.09 K/UL — SIGNIFICANT CHANGE UP (ref 0–0.5)
EOSINOPHIL NFR BLD AUTO: 0.8 % — SIGNIFICANT CHANGE UP (ref 0–6)
HCT VFR BLD CALC: 30.4 % — LOW (ref 34.5–45)
HGB BLD-MCNC: 9.4 G/DL — LOW (ref 11.5–15.5)
IANC: 8.85 K/UL — HIGH (ref 1.5–8.5)
IMM GRANULOCYTES NFR BLD AUTO: 0.5 % — SIGNIFICANT CHANGE UP (ref 0–1.5)
LYMPHOCYTES # BLD AUTO: 1.6 K/UL — SIGNIFICANT CHANGE UP (ref 1–3.3)
LYMPHOCYTES # BLD AUTO: 13.5 % — SIGNIFICANT CHANGE UP (ref 13–44)
MCHC RBC-ENTMCNC: 27.5 PG — SIGNIFICANT CHANGE UP (ref 27–34)
MCHC RBC-ENTMCNC: 30.9 GM/DL — LOW (ref 32–36)
MCV RBC AUTO: 88.9 FL — SIGNIFICANT CHANGE UP (ref 80–100)
MONOCYTES # BLD AUTO: 1.2 K/UL — HIGH (ref 0–0.9)
MONOCYTES NFR BLD AUTO: 10.2 % — SIGNIFICANT CHANGE UP (ref 2–14)
NEUTROPHILS # BLD AUTO: 8.85 K/UL — HIGH (ref 1.8–7.4)
NEUTROPHILS NFR BLD AUTO: 74.8 % — SIGNIFICANT CHANGE UP (ref 43–77)
NRBC # BLD: 0 /100 WBCS — SIGNIFICANT CHANGE UP
NRBC # FLD: 0 K/UL — SIGNIFICANT CHANGE UP
PLATELET # BLD AUTO: 228 K/UL — SIGNIFICANT CHANGE UP (ref 150–400)
RBC # BLD: 3.42 M/UL — LOW (ref 3.8–5.2)
RBC # FLD: 16.6 % — HIGH (ref 10.3–14.5)
WBC # BLD: 11.82 K/UL — HIGH (ref 3.8–10.5)
WBC # FLD AUTO: 11.82 K/UL — HIGH (ref 3.8–10.5)

## 2021-01-22 RX ORDER — IBUPROFEN 200 MG
600 TABLET ORAL EVERY 6 HOURS
Refills: 0 | Status: DISCONTINUED | OUTPATIENT
Start: 2021-01-22 | End: 2021-01-21

## 2021-01-22 RX ORDER — OXYCODONE HYDROCHLORIDE 5 MG/1
5 TABLET ORAL
Refills: 0 | Status: DISCONTINUED | OUTPATIENT
Start: 2021-01-22 | End: 2021-01-21

## 2021-01-22 RX ADMIN — Medication 975 MILLIGRAM(S): at 20:02

## 2021-01-22 RX ADMIN — OXYCODONE HYDROCHLORIDE 5 MILLIGRAM(S): 5 TABLET ORAL at 10:25

## 2021-01-22 RX ADMIN — HEPARIN SODIUM 5000 UNIT(S): 5000 INJECTION INTRAVENOUS; SUBCUTANEOUS at 05:36

## 2021-01-22 RX ADMIN — Medication 975 MILLIGRAM(S): at 13:12

## 2021-01-22 RX ADMIN — MAGNESIUM HYDROXIDE 30 MILLILITER(S): 400 TABLET, CHEWABLE ORAL at 06:24

## 2021-01-22 RX ADMIN — SIMETHICONE 80 MILLIGRAM(S): 80 TABLET, CHEWABLE ORAL at 20:02

## 2021-01-22 RX ADMIN — Medication 975 MILLIGRAM(S): at 05:35

## 2021-01-22 RX ADMIN — Medication 975 MILLIGRAM(S): at 00:06

## 2021-01-22 RX ADMIN — OXYCODONE HYDROCHLORIDE 5 MILLIGRAM(S): 5 TABLET ORAL at 20:02

## 2021-01-22 RX ADMIN — Medication 600 MILLIGRAM(S): at 17:38

## 2021-01-22 RX ADMIN — SIMETHICONE 80 MILLIGRAM(S): 80 TABLET, CHEWABLE ORAL at 06:24

## 2021-01-22 RX ADMIN — HEPARIN SODIUM 5000 UNIT(S): 5000 INJECTION INTRAVENOUS; SUBCUTANEOUS at 17:38

## 2021-01-22 RX ADMIN — SENNA PLUS 1 TABLET(S): 8.6 TABLET ORAL at 20:02

## 2021-01-22 RX ADMIN — MAGNESIUM HYDROXIDE 30 MILLILITER(S): 400 TABLET, CHEWABLE ORAL at 13:12

## 2021-01-22 RX ADMIN — SENNA PLUS 1 TABLET(S): 8.6 TABLET ORAL at 06:24

## 2021-01-22 RX ADMIN — Medication 30 MILLIGRAM(S): at 05:36

## 2021-01-23 RX ADMIN — MAGNESIUM HYDROXIDE 30 MILLILITER(S): 400 TABLET, CHEWABLE ORAL at 11:58

## 2021-01-23 RX ADMIN — OXYCODONE HYDROCHLORIDE 5 MILLIGRAM(S): 5 TABLET ORAL at 08:59

## 2021-01-23 RX ADMIN — HEPARIN SODIUM 5000 UNIT(S): 5000 INJECTION INTRAVENOUS; SUBCUTANEOUS at 22:26

## 2021-01-23 RX ADMIN — HEPARIN SODIUM 5000 UNIT(S): 5000 INJECTION INTRAVENOUS; SUBCUTANEOUS at 14:57

## 2021-01-23 RX ADMIN — Medication 600 MILLIGRAM(S): at 05:38

## 2021-01-23 RX ADMIN — OXYCODONE HYDROCHLORIDE 5 MILLIGRAM(S): 5 TABLET ORAL at 18:50

## 2021-01-23 RX ADMIN — OXYCODONE HYDROCHLORIDE 5 MILLIGRAM(S): 5 TABLET ORAL at 22:26

## 2021-01-23 RX ADMIN — Medication 1 TABLET(S): at 11:59

## 2021-01-23 RX ADMIN — Medication 975 MILLIGRAM(S): at 14:57

## 2021-01-23 RX ADMIN — Medication 600 MILLIGRAM(S): at 11:59

## 2021-01-23 RX ADMIN — MAGNESIUM HYDROXIDE 30 MILLILITER(S): 400 TABLET, CHEWABLE ORAL at 22:25

## 2021-01-23 RX ADMIN — Medication 600 MILLIGRAM(S): at 18:50

## 2021-01-23 RX ADMIN — Medication 975 MILLIGRAM(S): at 08:59

## 2021-01-23 RX ADMIN — HEPARIN SODIUM 5000 UNIT(S): 5000 INJECTION INTRAVENOUS; SUBCUTANEOUS at 05:38

## 2021-01-23 RX ADMIN — Medication 325 MILLIGRAM(S): at 11:59

## 2021-01-23 RX ADMIN — SIMETHICONE 80 MILLIGRAM(S): 80 TABLET, CHEWABLE ORAL at 11:59

## 2021-01-23 NOTE — PROGRESS NOTE ADULT - ASSESSMENT
Pt is a 39yo  POD2 from pLTCS for NRFHT in stable condition. Pt has h/o PEC, BPs stable. Pt is a 39yo  POD2 from pLTCS for NRFHT in stable condition. Pt has h/o PEC, remains asymptomatic with BPs stable.

## 2021-01-23 NOTE — PROGRESS NOTE ADULT - PROBLEM SELECTOR PLAN 1
- appropriate drop in H/H  - continue with PO analgesia  - increase ambulation  - continue regular diet  - encourage incentive spirometry  - d/c IV fluids  - incision dressing removed    Hortensia Madrigal, PGY1
- Continue with po analgesia  - Increase ambulation  - Continue regular diet  - saline IV lock  - No labs    Татьяна Choudhury, PGY-1

## 2021-01-24 VITALS
HEART RATE: 81 BPM | SYSTOLIC BLOOD PRESSURE: 122 MMHG | RESPIRATION RATE: 18 BRPM | DIASTOLIC BLOOD PRESSURE: 63 MMHG | OXYGEN SATURATION: 99 % | TEMPERATURE: 98 F

## 2021-01-24 RX ADMIN — OXYCODONE HYDROCHLORIDE 5 MILLIGRAM(S): 5 TABLET ORAL at 09:14

## 2021-01-24 RX ADMIN — MAGNESIUM HYDROXIDE 30 MILLILITER(S): 400 TABLET, CHEWABLE ORAL at 09:15

## 2021-01-24 RX ADMIN — Medication 975 MILLIGRAM(S): at 08:14

## 2021-01-24 RX ADMIN — Medication 600 MILLIGRAM(S): at 11:21

## 2021-01-24 RX ADMIN — OXYCODONE HYDROCHLORIDE 5 MILLIGRAM(S): 5 TABLET ORAL at 06:07

## 2021-01-24 RX ADMIN — Medication 975 MILLIGRAM(S): at 14:05

## 2021-01-24 RX ADMIN — Medication 1 TABLET(S): at 11:21

## 2021-01-24 RX ADMIN — HEPARIN SODIUM 5000 UNIT(S): 5000 INJECTION INTRAVENOUS; SUBCUTANEOUS at 06:07

## 2021-01-24 RX ADMIN — OXYCODONE HYDROCHLORIDE 5 MILLIGRAM(S): 5 TABLET ORAL at 14:05

## 2021-01-24 RX ADMIN — Medication 325 MILLIGRAM(S): at 11:21

## 2021-01-24 RX ADMIN — SIMETHICONE 80 MILLIGRAM(S): 80 TABLET, CHEWABLE ORAL at 08:17

## 2021-01-24 NOTE — PROGRESS NOTE ADULT - SUBJECTIVE AND OBJECTIVE BOX
Patient seen and examined at bedside, no acute overnight events. No acute concerns, pain well controlled. Patient is ambulating, voiding spontaneously, passing flatus, and tolerating regular diet. Denies CP, SOB, N/V, HA, blurred vision, epigastric pain.    Vital Signs Last 24 Hours  T(C): 36.6 (01-22-21 @ 21:44), Max: 36.7 (01-22-21 @ 02:32)  HR: 75 (01-22-21 @ 21:44) (60 - 91)  BP: 108/65 (01-22-21 @ 21:44) (98/63 - 134/82)  RR: 18 (01-22-21 @ 21:44) (15 - 18)  SpO2: 99% (01-22-21 @ 21:44) (98% - 100%)    Physical Exam:  General: NAD  Abdomen: Soft, non-tender, non-distended, fundus firm  Incision: Pfannensteil incision CDI, subcuticular suture closure  Pelvic: Lochia wnl    Labs:    Blood Type: A Positive  Antibody Screen: Negative  RPR: Negative               9.4    11.82 )-----------( 228      ( 01-22 @ 08:29 )             30.4                10.9   9.92  )-----------( 240      ( 01-21 @ 02:46 )             33.5                11.4   7.56  )-----------( 232      ( 01-20 @ 19:57 )             36.9         MEDICATIONS  (STANDING):  acetaminophen   Tablet .. 975 milliGRAM(s) Oral <User Schedule>  diphtheria/tetanus/pertussis (acellular) Vaccine (ADAcel) 0.5 milliLiter(s) IntraMuscular once  ferrous    sulfate 325 milliGRAM(s) Oral daily  heparin   Injectable 5000 Unit(s) SubCutaneous every 8 hours  ibuprofen  Tablet. 600 milliGRAM(s) Oral every 6 hours  influenza   Vaccine 0.5 milliLiter(s) IntraMuscular once  prenatal multivitamin 1 Tablet(s) Oral daily    MEDICATIONS  (PRN):  diphenhydrAMINE 25 milliGRAM(s) Oral every 6 hours PRN Pruritus  lanolin Ointment 1 Application(s) Topical every 6 hours PRN Sore Nipples  magnesium hydroxide Suspension 30 milliLiter(s) Oral two times a day PRN Constipation  oxyCODONE    IR 5 milliGRAM(s) Oral every 3 hours PRN Severe Pain (7 - 10)  senna 1 Tablet(s) Oral two times a day PRN Constipation  simethicone 80 milliGRAM(s) Chew every 4 hours PRN Gas      39y/o G_P_ POD#_ from _ for _ in stable condition. PMH significant for _. Current issues include _.
Day ___ of Anesthesia Pain Management Service    SUBJECTIVE:    Pain Scale Score	At rest: ___ 	With Activity: ___ 	[x ] Refer to charted pain scores    THERAPY:    [ ] IV PCA Morphine		[ ] 5 mg/mL	[ ] 1 mg/mL  [ ] IV PCA Hydromorphone	[ ] 5 mg/mL	[x ] 1 mg/mL  [ ] IV PCA Fentanyl		[ ] 50 micrograms/mL    Demand dose ___0.2 lockout __6_ (minutes)     MEDICATIONS  (STANDING):  acetaminophen   Tablet .. 975 milliGRAM(s) Oral <User Schedule>  diphtheria/tetanus/pertussis (acellular) Vaccine (ADAcel) 0.5 milliLiter(s) IntraMuscular once  ferrous    sulfate 325 milliGRAM(s) Oral daily  heparin   Injectable 5000 Unit(s) SubCutaneous every 8 hours  HYDROmorphone PCA (1 mG/mL) 30 milliLiter(s) PCA Continuous PCA Continuous  ibuprofen  Tablet. 600 milliGRAM(s) Oral every 6 hours  influenza   Vaccine 0.5 milliLiter(s) IntraMuscular once  ketorolac   Injectable 30 milliGRAM(s) IV Push every 6 hours  prenatal multivitamin 1 Tablet(s) Oral daily    MEDICATIONS  (PRN):  diphenhydrAMINE 25 milliGRAM(s) Oral every 6 hours PRN Pruritus  diphenhydrAMINE   Injectable 25 milliGRAM(s) IV Push every 4 hours PRN Pruritus  HYDROmorphone PCA (1 mG/mL) Rescue Clinician Bolus 0.5 milliGRAM(s) IV Push every 15 minutes PRN for Pain Scale GREATER THAN 6  lanolin Ointment 1 Application(s) Topical every 6 hours PRN Sore Nipples  magnesium hydroxide Suspension 30 milliLiter(s) Oral two times a day PRN Constipation  naloxone Injectable 0.1 milliGRAM(s) IV Push every 3 minutes PRN For ANY of the following changes in patient status:  A. RR LESS THAN 10 breaths per minute, B. Oxygen saturation LESS THAN 90%, C. Sedation score of 6  ondansetron Injectable 4 milliGRAM(s) IV Push every 6 hours PRN Nausea  oxyCODONE    IR 5 milliGRAM(s) Oral every 3 hours PRN Moderate to Severe Pain (4-10)  oxyCODONE    IR 5 milliGRAM(s) Oral once PRN Moderate to Severe Pain (4-10)  senna 1 Tablet(s) Oral two times a day PRN Constipation  simethicone 80 milliGRAM(s) Chew every 4 hours PRN Gas      OBJECTIVE:    Sedation Score:	[ ] Alert	[ ] Drowsy 	[ ] Arousable	[ ] Asleep	[ ] Unresponsive    Side Effects:	[ ] None	[ ] Nausea	[ ] Vomiting	[ ] Pruritus  		[ ] Other:    Vital Signs Last 24 Hrs  T(C): 36.5 (22 Jan 2021 05:44), Max: 37.3 (21 Jan 2021 18:58)  T(F): 97.7 (22 Jan 2021 05:44), Max: 99.1 (21 Jan 2021 18:58)  HR: 81 (22 Jan 2021 05:44) (60 - 110)  BP: 134/82 (22 Jan 2021 05:44) (98/63 - 134/82)  BP(mean): 81 (21 Jan 2021 11:00) (74 - 91)  RR: 16 (22 Jan 2021 05:44) (15 - 25)  SpO2: 99% (22 Jan 2021 05:44) (98% - 100%)    ASSESSMENT/ PLAN    Therapy to  be:	[ ] Continue   [ ] Discontinued   [ ] Change to prn Analgesics    Documentation and Verification of current medications:   [X] Done	[ ] Not done, not elligible    Comments:
OB Postpartum Note: Primary  Delivery, POD#3    S: 39yo  POD#3 s/p pLTCS. OBhx: PEC HELLP labs WNL.  PMH: HSV II outbreak three weeks ago.  Anemia s/p iron infusions X 5 doses in pregnancy.  Asthma, no meds.  Fibroids, s/p hysteroscopic myomectomy.  PSHX: myomectomy, gastric sleeve.      The patient feels well.  Pain is well controlled.  Patient noted to have subcutaneous nodule approximately golf-ball size, no pain or tenderness.  Per patient she usually gets nodules after receiving heparin in the past.  She is tolerating a regular diet and passing flatus. She is voiding spontaneously, and ambulating without difficulty. Denies CP/SOB. Denies lightheadedness/dizziness. Denies N/V.    O:  Vitals:  Vital Signs Last 24 Hrs  T(C): 36.8 (2021 05:44), Max: 37.1 (2021 13:45)  T(F): 98.2 (2021 05:44), Max: 98.8 (2021 13:45)  HR: 76 (2021 05:44) (75 - 79)  BP: 131/82 (2021 05:44) (108/78 - 131/82)  BP(mean): --  RR: 16 (2021 05:44) (16 - 18)  SpO2: 100% (2021 05:44) (99% - 100%)    MEDICATIONS  (STANDING):  acetaminophen   Tablet .. 975 milliGRAM(s) Oral <User Schedule>  diphtheria/tetanus/pertussis (acellular) Vaccine (ADAcel) 0.5 milliLiter(s) IntraMuscular once  ferrous    sulfate 325 milliGRAM(s) Oral daily  heparin   Injectable 5000 Unit(s) SubCutaneous every 8 hours  ibuprofen  Tablet. 600 milliGRAM(s) Oral every 6 hours  prenatal multivitamin 1 Tablet(s) Oral daily    MEDICATIONS  (PRN):  diphenhydrAMINE 25 milliGRAM(s) Oral every 6 hours PRN Pruritus  lanolin Ointment 1 Application(s) Topical every 6 hours PRN Sore Nipples  magnesium hydroxide Suspension 30 milliLiter(s) Oral two times a day PRN Constipation  oxyCODONE    IR 5 milliGRAM(s) Oral every 3 hours PRN Severe Pain (7 - 10)  senna 1 Tablet(s) Oral two times a day PRN Constipation  simethicone 80 milliGRAM(s) Chew every 4 hours PRN Gas      LABS:  Blood type: A Positive  Rubella IgG: RPR: Negative                          9.4<L>   11.82<H> >-----------< 228    (  @ 08:29 )             30.4<L>      Physical exam:  Gen: NAD  Abdomen: Soft, nontender, no distension , firm uterine fundus at umbilicus.   Incision: Clean, dry, and intact with steristrips  Pelvic: Normal lochia noted  Ext: No calf tenderness    A/P: 39yo POD#3 s/p pLTCS.  Patient is stable and is doing well post-operatively.  - Continue motrin, tylenol, oxycodone PRN for pain control.  - Increase ambulation  - Continue regular diet  - Discussed worsening signs of PEC.  Patient has BP script.  - Discharge planning    Hazel RG        
Postop Day  __1_ s/p   C- Section    THERAPY:  [  ] Spinal morphine   (  ) mg  on (        )  [  ] Epidural morphine   [ x ] IV PCA Hydromorphone 1 mg/ml      Sedation Score:	  [ x ] Alert	    [  ] Drowsy        [  ] Arousable	[  ] Asleep	[  ] Unresponsive    Side Effects:	  [ x ] None	     [  ] Nausea        [  ] Pruritus        [  ] Weakness   [  ] Numbness        ASSESSMENT/ PLAN   [   ] Discontinue         [  ] Continue  [ x ]Documentation and Verification of current medications     Comments: Transitioned to prn oral analgesics by primary team. No anesthetic complication.
Patient seen and examined at bedside, no acute overnight events. No acute complaints, pain well controlled. Patient is ambulating and tolerating regular diet. Has not yet passed flatus. Denies CP, SOB, N/V, HA, blurred vision, epigastric pain.    Vital Signs Last 24 Hours  T(C): 36.7 (01-22-21 @ 10:00), Max: 37.3 (01-21-21 @ 18:58)  HR: 91 (01-22-21 @ 10:00) (60 - 91)  BP: 133/75 (01-22-21 @ 10:00) (98/63 - 134/82)  RR: 17 (01-22-21 @ 10:00) (15 - 21)  SpO2: 100% (01-22-21 @ 10:00) (99% - 100%)    I&O's Summary    21 Jan 2021 07:01  -  22 Jan 2021 07:00  --------------------------------------------------------  IN: 1800 mL / OUT: 2197 mL / NET: -397 mL        Physical Exam:  General: NAD  Abdomen: Soft, expected tenderness, non-distended, fundus firm  Incision: Pfannenstiel incision CDI, steristrips in place  Pelvic: Lochia wnl    Labs:    Blood Type: A Positive  Antibody Screen: Negative  RPR: Negative               9.4    11.82 )-----------( 228      ( 01-22 @ 08:29 )             30.4                10.9   9.92  )-----------( 240      ( 01-21 @ 02:46 )             33.5                11.4   7.56  )-----------( 232      ( 01-20 @ 19:57 )             36.9         MEDICATIONS  (STANDING):  acetaminophen   Tablet .. 975 milliGRAM(s) Oral <User Schedule>  diphtheria/tetanus/pertussis (acellular) Vaccine (ADAcel) 0.5 milliLiter(s) IntraMuscular once  ferrous    sulfate 325 milliGRAM(s) Oral daily  heparin   Injectable 5000 Unit(s) SubCutaneous every 8 hours  ibuprofen  Tablet. 600 milliGRAM(s) Oral every 6 hours  influenza   Vaccine 0.5 milliLiter(s) IntraMuscular once  prenatal multivitamin 1 Tablet(s) Oral daily    MEDICATIONS  (PRN):  diphenhydrAMINE 25 milliGRAM(s) Oral every 6 hours PRN Pruritus  lanolin Ointment 1 Application(s) Topical every 6 hours PRN Sore Nipples  magnesium hydroxide Suspension 30 milliLiter(s) Oral two times a day PRN Constipation  oxyCODONE    IR 5 milliGRAM(s) Oral every 3 hours PRN Severe Pain (7 - 10)  senna 1 Tablet(s) Oral two times a day PRN Constipation  simethicone 80 milliGRAM(s) Chew every 4 hours PRN Gas

## 2021-01-26 DIAGNOSIS — G44.219 EPISODIC TENSION-TYPE HEADACHE, NOT INTRACTABLE: ICD-10-CM

## 2021-01-26 DIAGNOSIS — O14.90 UNSPECIFIED PRE-ECLAMPSIA, UNSPECIFIED TRIMESTER: ICD-10-CM

## 2021-01-26 PROBLEM — Z33.2 ENCOUNTER FOR ELECTIVE TERMINATION OF PREGNANCY: Chronic | Status: ACTIVE | Noted: 2021-01-20

## 2021-01-26 PROBLEM — J45.909 UNSPECIFIED ASTHMA, UNCOMPLICATED: Chronic | Status: ACTIVE | Noted: 2021-01-20

## 2021-01-26 PROBLEM — D64.9 ANEMIA, UNSPECIFIED: Chronic | Status: ACTIVE | Noted: 2021-01-20

## 2021-01-26 PROBLEM — G43.909 MIGRAINE, UNSPECIFIED, NOT INTRACTABLE, WITHOUT STATUS MIGRAINOSUS: Chronic | Status: ACTIVE | Noted: 2021-01-20

## 2021-01-26 PROBLEM — O03.4 INCOMPLETE SPONTANEOUS ABORTION WITHOUT COMPLICATION: Chronic | Status: ACTIVE | Noted: 2021-01-20

## 2021-01-26 LAB
ALBUMIN SERPL ELPH-MCNC: 4.1 G/DL — SIGNIFICANT CHANGE UP (ref 3.3–5)
ALP SERPL-CCNC: 102 U/L — SIGNIFICANT CHANGE UP (ref 40–120)
ALT FLD-CCNC: 29 U/L — SIGNIFICANT CHANGE UP (ref 4–33)
ANION GAP SERPL CALC-SCNC: 9 MMOL/L — SIGNIFICANT CHANGE UP (ref 7–14)
ANISOCYTOSIS BLD QL: SLIGHT — SIGNIFICANT CHANGE UP
APPEARANCE UR: CLEAR — SIGNIFICANT CHANGE UP
APTT BLD: 27.6 SEC — SIGNIFICANT CHANGE UP (ref 27–36.3)
AST SERPL-CCNC: 36 U/L — HIGH (ref 4–32)
BACTERIA # UR AUTO: NEGATIVE — SIGNIFICANT CHANGE UP
BASOPHILS # BLD AUTO: 0 K/UL — SIGNIFICANT CHANGE UP (ref 0–0.2)
BASOPHILS NFR BLD AUTO: 0 % — SIGNIFICANT CHANGE UP (ref 0–2)
BILIRUB SERPL-MCNC: <0.2 MG/DL — SIGNIFICANT CHANGE UP (ref 0.2–1.2)
BILIRUB UR-MCNC: NEGATIVE — SIGNIFICANT CHANGE UP
BUN SERPL-MCNC: 11 MG/DL — SIGNIFICANT CHANGE UP (ref 7–23)
CALCIUM SERPL-MCNC: 9.2 MG/DL — SIGNIFICANT CHANGE UP (ref 8.4–10.5)
CHLORIDE SERPL-SCNC: 103 MMOL/L — SIGNIFICANT CHANGE UP (ref 98–107)
CO2 SERPL-SCNC: 28 MMOL/L — SIGNIFICANT CHANGE UP (ref 22–31)
COLOR SPEC: SIGNIFICANT CHANGE UP
CREAT ?TM UR-MCNC: 53 MG/DL — SIGNIFICANT CHANGE UP
CREAT SERPL-MCNC: 0.8 MG/DL — SIGNIFICANT CHANGE UP (ref 0.5–1.3)
DIFF PNL FLD: ABNORMAL
ELLIPTOCYTES BLD QL SMEAR: SLIGHT — SIGNIFICANT CHANGE UP
EOSINOPHIL # BLD AUTO: 0.05 K/UL — SIGNIFICANT CHANGE UP (ref 0–0.5)
EOSINOPHIL NFR BLD AUTO: 0.9 % — SIGNIFICANT CHANGE UP (ref 0–6)
EPI CELLS # UR: 0 /HPF — SIGNIFICANT CHANGE UP (ref 0–5)
FIBRINOGEN PPP-MCNC: 787 MG/DL — HIGH (ref 290–520)
GIANT PLATELETS BLD QL SMEAR: PRESENT — SIGNIFICANT CHANGE UP
GLUCOSE SERPL-MCNC: 79 MG/DL — SIGNIFICANT CHANGE UP (ref 70–99)
GLUCOSE UR QL: NEGATIVE — SIGNIFICANT CHANGE UP
HCT VFR BLD CALC: 35.4 % — SIGNIFICANT CHANGE UP (ref 34.5–45)
HGB BLD-MCNC: 10.9 G/DL — LOW (ref 11.5–15.5)
HYALINE CASTS # UR AUTO: 1 /LPF — SIGNIFICANT CHANGE UP (ref 0–7)
IANC: 3.56 K/UL — SIGNIFICANT CHANGE UP (ref 1.5–8.5)
INR BLD: <0.9 RATIO — LOW (ref 0.88–1.16)
KETONES UR-MCNC: NEGATIVE — SIGNIFICANT CHANGE UP
LDH SERPL L TO P-CCNC: 259 U/L — HIGH (ref 135–225)
LEUKOCYTE ESTERASE UR-ACNC: NEGATIVE — SIGNIFICANT CHANGE UP
LYMPHOCYTES # BLD AUTO: 1.36 K/UL — SIGNIFICANT CHANGE UP (ref 1–3.3)
LYMPHOCYTES # BLD AUTO: 22.6 % — SIGNIFICANT CHANGE UP (ref 13–44)
MACROCYTES BLD QL: SLIGHT — SIGNIFICANT CHANGE UP
MCHC RBC-ENTMCNC: 27.7 PG — SIGNIFICANT CHANGE UP (ref 27–34)
MCHC RBC-ENTMCNC: 30.8 GM/DL — LOW (ref 32–36)
MCV RBC AUTO: 89.8 FL — SIGNIFICANT CHANGE UP (ref 80–100)
MONOCYTES # BLD AUTO: 0.31 K/UL — SIGNIFICANT CHANGE UP (ref 0–0.9)
MONOCYTES NFR BLD AUTO: 5.2 % — SIGNIFICANT CHANGE UP (ref 2–14)
NEUTROPHILS # BLD AUTO: 4.07 K/UL — SIGNIFICANT CHANGE UP (ref 1.8–7.4)
NEUTROPHILS NFR BLD AUTO: 66.9 % — SIGNIFICANT CHANGE UP (ref 43–77)
NEUTS BAND # BLD: 0.9 % — SIGNIFICANT CHANGE UP (ref 0–6)
NITRITE UR-MCNC: NEGATIVE — SIGNIFICANT CHANGE UP
OVALOCYTES BLD QL SMEAR: SLIGHT — SIGNIFICANT CHANGE UP
PH UR: 8 — SIGNIFICANT CHANGE UP (ref 5–8)
PLAT MORPH BLD: NORMAL — SIGNIFICANT CHANGE UP
PLATELET # BLD AUTO: 374 K/UL — SIGNIFICANT CHANGE UP (ref 150–400)
PLATELET COUNT - ESTIMATE: NORMAL — SIGNIFICANT CHANGE UP
POIKILOCYTOSIS BLD QL AUTO: SLIGHT — SIGNIFICANT CHANGE UP
POLYCHROMASIA BLD QL SMEAR: SLIGHT — SIGNIFICANT CHANGE UP
POTASSIUM SERPL-MCNC: 3.9 MMOL/L — SIGNIFICANT CHANGE UP (ref 3.5–5.3)
POTASSIUM SERPL-SCNC: 3.9 MMOL/L — SIGNIFICANT CHANGE UP (ref 3.5–5.3)
PROT ?TM UR-MCNC: 6 MG/DL — SIGNIFICANT CHANGE UP
PROT ?TM UR-MCNC: 6 MG/DL — SIGNIFICANT CHANGE UP
PROT SERPL-MCNC: 7.3 G/DL — SIGNIFICANT CHANGE UP (ref 6–8.3)
PROT UR-MCNC: NEGATIVE — SIGNIFICANT CHANGE UP
PROT/CREAT UR-RTO: 0.1 RATIO — SIGNIFICANT CHANGE UP (ref 0–0.2)
PROTHROM AB SERPL-ACNC: 9.9 SEC — LOW (ref 10.6–13.6)
RBC # BLD: 3.94 M/UL — SIGNIFICANT CHANGE UP (ref 3.8–5.2)
RBC # FLD: 16.3 % — HIGH (ref 10.3–14.5)
RBC BLD AUTO: ABNORMAL
RBC CASTS # UR COMP ASSIST: 28 /HPF — HIGH (ref 0–4)
SODIUM SERPL-SCNC: 140 MMOL/L — SIGNIFICANT CHANGE UP (ref 135–145)
SP GR SPEC: 1.01 — SIGNIFICANT CHANGE UP (ref 1.01–1.02)
URATE SERPL-MCNC: 4.4 MG/DL — SIGNIFICANT CHANGE UP (ref 2.5–7)
UROBILINOGEN FLD QL: SIGNIFICANT CHANGE UP
VARIANT LYMPHS # BLD: 3.5 % — SIGNIFICANT CHANGE UP (ref 0–6)
WBC # BLD: 6 K/UL — SIGNIFICANT CHANGE UP (ref 3.8–10.5)
WBC # FLD AUTO: 6 K/UL — SIGNIFICANT CHANGE UP (ref 3.8–10.5)
WBC UR QL: 2 /HPF — SIGNIFICANT CHANGE UP (ref 0–5)

## 2021-01-26 RX ORDER — IBUPROFEN 200 MG
600 TABLET ORAL ONCE
Refills: 0 | Status: COMPLETED | OUTPATIENT
Start: 2021-01-26 | End: 2021-01-26

## 2021-01-26 RX ORDER — VALACYCLOVIR 500 MG/1
0 TABLET, FILM COATED ORAL
Qty: 0 | Refills: 0 | DISCHARGE

## 2021-01-26 RX ADMIN — Medication 600 MILLIGRAM(S): at 14:40

## 2021-01-26 NOTE — CHART NOTE - NSCHARTNOTEFT_GEN_A_CORE
37 yo , presented to D&T c/o headache since this morning; s/p  section on 2021 for NRFHTs, labor induction for gestational HTN.  Patient reported taking Tylenol 975 mg PO since this morning without relief. BP at home: 149/99.  Patient was discharged home without anti hypertensive medication, normotensive.   Patient is bottle-feeding.   OB hx: 2021,  section            1 SAB, 1 ETOP, 2 D&Cs  Med hx: asthma, last attack a few years ago; denies history of intubation; anemia, iron infusions antepartum  Surg hx; , myomectomy, hysteroscopic               , gastric sleeve              2021, primary  section, [id number]              2 D&Cs  GYN hx: history of HSV, history of uterine fibroids, abnormal Pap smear   Meds: PNV  Allergy: Penicillin (anaphylaxis)     Upon evaluation,   BP ranges 140//95, HR 62-70 bpm, afebrile  lungs clear bilaterally upon auscultation  incision appears clean, no signs of infection; steri strips on;  reflexes +1 bilaterally  A: 37 yo , s/p  section, PPD#4, r/o preeclampsia vs tension headache.  P: HELLP labs sent; case was reviewed with Dr Bhardwaj, was advised to administer Motrin and reevaluate for head imaging.

## 2021-01-26 NOTE — H&P ADULT - NSICDXPASTMEDICALHX_GEN_ALL_CORE_FT
PAST MEDICAL HISTORY:  Anemia     Asthma albuterol/proventil    Incomplete spontaneous      Legal termination of pregnancy     Migraines

## 2021-01-26 NOTE — H&P ADULT - NSICDXPASTSURGICALHX_GEN_ALL_CORE_FT
PAST SURGICAL HISTORY:  (induced) termination of pregnancy with unspecified complications     H/O bariatric surgery Gastric sleeve 2018    History of D&C     History of herpes labialis Valtrex 500mg po bid    Status post hysteroscopic myomectomy

## 2021-01-26 NOTE — H&P ADULT - ASSESSMENT
37 yo , presented to D&T c/o headache since this morning; s/p  section on 2021 for NRFHTs, labor induction for gestational HTN.  Patient reported taking Tylenol 975 mg PO since this morning without relief. BP at home: 149/99.  Patient was discharged home without anti hypertensive medication, normotensive.   Patient is bottle-feeding.   OB hx: 2021,  section            1 SAB, 1 ETOP, 2 D&Cs  Med hx: asthma, last attack a few years ago; denies history of intubation; anemia, iron infusions antepartum  Surg hx; , myomectomy, hysteroscopic               , gastric sleeve              2021, primary  section, [id number]              2 D&Cs  GYN hx: history of HSV, history of uterine fibroids, abnormal Pap smear   Meds: PNV  Allergy: Penicillin (anaphylaxis)     Upon evaluation,   BP ranges 140//95, HR 62-70 bpm, afebrile  lungs clear bilaterally upon auscultation  incision appears clean, no signs of infection; steri strips on;  reflexes +1 bilaterally  A: 37 yo , s/p  section, PPD#4, r/o preeclampsia vs tension headache.  P: HELLP labs sent; case was reviewed with Dr Bhardwaj, was advised to administer Motrin and reevaluate for head imaging.  37 yo , presented to D&T c/o headache since this morning; s/p  section on 2021 for NRFHTs, labor induction for gestational HTN.  Patient reported taking Tylenol 975 mg PO since this morning without relief. BP at home: 149/99.  Patient was discharged home without anti hypertensive medication, normotensive.   Patient is bottle-feeding.   OB hx: 2021,  section            1 SAB, 1 ETOP, 2 D&Cs  Med hx: asthma, last attack a few years ago; denies history of intubation; anemia, iron infusions antepartum  Surg hx; , myomectomy, hysteroscopic               , gastric sleeve              2021, primary  section, [id number]              2 D&Cs  GYN hx: history of HSV, history of uterine fibroids, abnormal Pap smear   Meds: PNV  Allergy: Penicillin (anaphylaxis)     Upon evaluation,   BP ranges 140//95, HR 62-70 bpm, afebrile  lungs clear bilaterally upon auscultation  incision appears clean, no signs of infection; steri strips on;  reflexes +1 bilaterally  A: 37 yo , s/p  section, PPD#4, r/o preeclampsia vs tension headache.  P: HELLP labs sent; case was reviewed with Dr Bhardwaj, was advised to administer Motrin and reevaluate for head imaging.     addendum: HELLP labs wnl, //99, case was reviewed with Dr Bhardwaj, was adviser dto discharge patient home.  check BP at home and to report any elevated readings to PMD; follow up with Dr Caba in 1 week.

## 2021-02-04 DIAGNOSIS — O26.899 OTHER SPECIFIED PREGNANCY RELATED CONDITIONS, UNSPECIFIED TRIMESTER: ICD-10-CM

## 2021-02-04 DIAGNOSIS — Z3A.00 WEEKS OF GESTATION OF PREGNANCY NOT SPECIFIED: ICD-10-CM

## 2021-02-09 ENCOUNTER — APPOINTMENT (OUTPATIENT)
Dept: OBGYN | Facility: CLINIC | Age: 41
End: 2021-02-09
Payer: MEDICAID

## 2021-02-09 PROCEDURE — 99072 ADDL SUPL MATRL&STAF TM PHE: CPT

## 2021-02-09 PROCEDURE — 99211 OFF/OP EST MAY X REQ PHY/QHP: CPT | Mod: TH

## 2021-02-09 NOTE — OB NEONATOLOGY/PEDIATRICIAN DELIVERY SUMMARY - NS_FINALEDD_OBGYN_ALL_OB_DT
Patient being seen today for follow up on her chronic conditions no concerns. She says she is not able to see Urology as of right now due to issues with her knees. 1. Have you been to the ER, urgent care clinic since your last visit? Hospitalized since your last visit? No  2. Have you seen or consulted any other health care providers outside of the [address] [place] [address] since your last visit? Include any pap smears or colon screening. No    Medication reconciliation has been completed with patient. Care team discussed/updated as well as pharmacy.     Health Maintenance Due   Topic Date Due    Eye Exam Retinal or Dilated  03/30/1964    COVID-19 Vaccine (1 of 2) 03/30/1970    DTaP/Tdap/Td series (1 - Tdap) 03/30/1975    Shingrix Vaccine Age 50> (1 of 2) 03/30/2004    GLAUCOMA SCREENING Q2Y  03/30/2019    Bone Densitometry (Dexa) Screening  03/30/2019    MICROALBUMIN Q1  10/18/2020    Medicare Yearly Exam  01/17/2021 15-Arthur-2021

## 2021-03-10 ENCOUNTER — APPOINTMENT (OUTPATIENT)
Dept: OBGYN | Facility: CLINIC | Age: 41
End: 2021-03-10
Payer: MEDICAID

## 2021-03-10 PROCEDURE — 99072 ADDL SUPL MATRL&STAF TM PHE: CPT

## 2021-03-10 PROCEDURE — 99211 OFF/OP EST MAY X REQ PHY/QHP: CPT | Mod: TH

## 2024-10-11 NOTE — ED ADULT NURSE NOTE - BREATH SOUNDS, MLM
Pre-Procedure Patient Identification:  I am the Primary Anesthesiologist and have identified the patient on 10/11/24 at 12:05 PM.   I have confirmed the procedure(s) will be performed by the following surgeon/proceduralist Hubert Fuchs MD.   Clear